# Patient Record
Sex: FEMALE | Race: OTHER | HISPANIC OR LATINO | Employment: UNEMPLOYED | URBAN - METROPOLITAN AREA
[De-identification: names, ages, dates, MRNs, and addresses within clinical notes are randomized per-mention and may not be internally consistent; named-entity substitution may affect disease eponyms.]

---

## 2021-02-23 ENCOUNTER — TELEPHONE (OUTPATIENT)
Dept: FAMILY MEDICINE CLINIC | Facility: CLINIC | Age: 26
End: 2021-02-23

## 2021-02-23 NOTE — TELEPHONE ENCOUNTER
Spoke to Kandy Norman at [facility] [employer], she advised that patient has active insurance through them but she needs to call the [facility] office to make sure they terminated her other insurances through [facility]

## 2021-03-03 ENCOUNTER — OFFICE VISIT (OUTPATIENT)
Dept: FAMILY MEDICINE CLINIC | Facility: CLINIC | Age: 26
End: 2021-03-03
Payer: COMMERCIAL

## 2021-03-03 VITALS
OXYGEN SATURATION: 98 % | DIASTOLIC BLOOD PRESSURE: 77 MMHG | HEART RATE: 109 BPM | BODY MASS INDEX: 47.12 KG/M2 | WEIGHT: 276 LBS | TEMPERATURE: 96.8 F | SYSTOLIC BLOOD PRESSURE: 132 MMHG | HEIGHT: 64 IN

## 2021-03-03 DIAGNOSIS — L98.9 SKIN LESION: ICD-10-CM

## 2021-03-03 DIAGNOSIS — R03.0 BLOOD PRESSURE ELEVATED WITHOUT HISTORY OF HTN: ICD-10-CM

## 2021-03-03 DIAGNOSIS — R73.03 PREDIABETES: ICD-10-CM

## 2021-03-03 DIAGNOSIS — E28.2 POLYCYSTIC OVARIAN DISEASE: Primary | ICD-10-CM

## 2021-03-03 DIAGNOSIS — R00.0 INCREASED HEART RATE: ICD-10-CM

## 2021-03-03 DIAGNOSIS — R63.5 WEIGHT GAIN: ICD-10-CM

## 2021-03-03 DIAGNOSIS — N63.20 LEFT BREAST LUMP: ICD-10-CM

## 2021-03-03 DIAGNOSIS — E66.01 MORBID OBESITY (HCC): ICD-10-CM

## 2021-03-03 PROCEDURE — 99204 OFFICE O/P NEW MOD 45 MIN: CPT | Performed by: FAMILY MEDICINE

## 2021-03-03 PROCEDURE — 3725F SCREEN DEPRESSION PERFORMED: CPT | Performed by: FAMILY MEDICINE

## 2021-03-03 NOTE — PROGRESS NOTES
Assessment/Plan:       No problem-specific Assessment & Plan notes found for this encounter  Diagnoses and all orders for this visit:    Polycystic ovarian disease  -     Ambulatory referral to Obstetrics / Gynecology; Future  -     Lipid Panel with Direct LDL reflex; Future  -     Comprehensive metabolic panel; Future  -     CBC and differential; Future  -     Pregnancy Test (HCG Qualitative); Future    Prediabetes  -     Lipid Panel with Direct LDL reflex; Future  -     Hemoglobin A1C; Future  -     Comprehensive metabolic panel; Future  -     Microalbumin / creatinine urine ratio    Weight gain  -     TSH, 3rd generation with Free T4 reflex; Future  -     Lipid Panel with Direct LDL reflex; Future    Blood pressure elevated without history of HTN  -     Comprehensive metabolic panel; Future  -     CBC and differential; Future  -     Cancel: POCT ECG  -     ECG 12 lead; Future    Increased heart rate  -     Cancel: POCT ECG  -     ECG 12 lead; Future    Skin lesion    Left breast lump  -     US breast left limited (diagnostic); Future    Morbid obesity [facility])        Patient Instructions   Follow up with test results      Orders Placed This Encounter   Procedures    US breast left limited (diagnostic)     Standing Status:   Future     Standing Expiration Date:   3/3/2025     Scheduling Instructions:      Please bring all non [facility] breast imaging studies with your appointment  Having your comparison exams available to us on the day of your exam allows you to obtain your results faster and may decrease the need foradditional testing  If patient is due for a screening mammogram, you must schedule the screening mammogram and ABUS in the same location on the same day for best patient care  Do not wear any perfume, powder, lotion or deodorant on the breast of underarm the day of testing  Please bring your insurance cards, form of photo ID and a list of youmedications with you  To schedule this appointment, please contact Central Scheduling at [phone]  Order Specific Question:   Reason for Exam:     Answer:   Lumpy area medial left breast     Order Specific Question:   Is the patient pregnant? Answer:   Unknown     Order Specific Question:   Approval for Diagnostic Call Back/Ultrasound If Necessary     Answer:   Yes     Order Specific Question:   Approval for Coordination for Additional Testing     Answer: Yes    TSH, 3rd generation with Free T4 reflex     Standing Status:   Future     Standing Expiration Date:   3/3/2022    Lipid Panel with Direct LDL reflex     This is a patient instruction: This test requires patient fasting for 10-12 hours or longer  Drinking of black coffee or black tea is acceptable  Standing Status:   Future     Standing Expiration Date:   3/3/2022    Hemoglobin A1C     Standing Status:   Future     Standing Expiration Date:   3/3/2022    Comprehensive metabolic panel     This is a patient instruction: Patient fasting for 8 hours or longer recommended  Standing Status:   Future     Standing Expiration Date:   3/3/2022    Microalbumin / creatinine urine ratio    CBC and differential     This is a patient instruction: This test is non-fasting  Please drink two glasses of water morning of bloodwork          Standing Status:   Future     Standing Expiration Date:   3/3/2022    Pregnancy Test (HCG Qualitative)     Standing Status:   Future     Standing Expiration Date:   3/3/2022    Ambulatory referral to Obstetrics / Gynecology     Standing Status:   Future     Standing Expiration Date:   3/3/2022     Referral Priority:   Routine     Referral Type:   Consult - AMB     Referral Reason:   Specialty Services Required     Referred to Provider:   Dk Talley MD     Requested Specialty:   Obstetrics and Gynecology     Number of Visits Requested:   1     Expiration Date:   3/3/2022    ECG 12 lead     Standing Status:   Future Standing Expiration Date:   3/3/2022         Subjective:     Patient ID: Sandy Golden is a 31 y o  female      HPI  New patient, she wants to establish  Follow-up  Polycystic ovarian disease  Patient stated about 1 year ago start having irregularity of her menstrual   She was evaluated and she was diagnosed with polycystic ovarian disease she was taking birth control  She stated her menstrual   Comes every 2-3 months  Last time she had her menstrual period was December 15 last year  Patient is sexually active with the same sex partner  Prediabetic  About 1 year ago she was told she was prediabetic  He was not placed on any medication  Denied polyuria or polydipsia  Skin lesion patient stated she has large skin lesion at the lower back for 2 years with no changes  Denied bleeding or pain  Obesity  Patient has been obese for a very long time  Recently has gained 10 lb in the last 2 months  Patient did not change her diet or level of exercise  New complaint  Breast lump  She did notice  about 2 months ago a small lump located at the medial mid breast   Next to the chest wall  No change in the size  Not painful  Denied injury  Her pulse and her blood pressure is slightly abnormal at this office visit patient stated she usually gets nervous when she comes to the doctor office and she feel little nervous right now  She denied any history of hypertension or arrhythmia    Review of Systems   Constitutional: Positive for unexpected weight change  Negative for activity change, appetite change, chills, fatigue and fever  HENT: Negative for congestion, ear discharge, ear pain, hearing loss, nosebleeds, rhinorrhea, sinus pressure, sore throat, tinnitus, trouble swallowing and voice change  Eyes: Negative for photophobia, pain and visual disturbance  Respiratory: Negative for cough, chest tightness, shortness of breath and wheezing      Cardiovascular: Negative for chest pain, palpitations and leg swelling  Gastrointestinal: Negative for abdominal pain, anal bleeding, blood in stool, constipation, diarrhea, nausea and vomiting  Endocrine: Negative for cold intolerance, heat intolerance, polydipsia and polyuria  Genitourinary: Positive for menstrual problem  Negative for dysuria, frequency, hematuria and urgency  Musculoskeletal: Negative for arthralgias, back pain, gait problem, joint swelling, myalgias and neck pain  Skin: Negative for color change, pallor and rash  Neurological: Negative for dizziness, tremors, seizures, syncope, facial asymmetry, speech difficulty, weakness, light-headedness and headaches  Hematological: Negative for adenopathy  Does not bruise/bleed easily  Psychiatric/Behavioral: Negative for agitation, behavioral problems, confusion, dysphoric mood, hallucinations and sleep disturbance  The patient is not nervous/anxious  Objective:     Physical Exam  Constitutional:       General: She is not in acute distress  Appearance: Normal appearance  She is well-developed  She is obese  She is not diaphoretic  HENT:      Head: Normocephalic and atraumatic  Nose:      Comments: Nose, and throat  Not examined  Patient has a mask  Due to COVID  Eyes:      General: No scleral icterus  Right eye: No discharge  Left eye: No discharge  Conjunctiva/sclera: Conjunctivae normal       Pupils: Pupils are equal, round, and reactive to light  Neck:      Musculoskeletal: Normal range of motion and neck supple  No muscular tenderness  Thyroid: No thyromegaly  Vascular: No carotid bruit or JVD  Cardiovascular:      Rate and Rhythm: Normal rate and regular rhythm  Pulses:           Carotid pulses are 3+ on the right side and 3+ on the left side  Posterior tibial pulses are 3+ on the right side and 3+ on the left side  Heart sounds: Normal heart sounds  No murmur  Comments: Extremities    No edema  Pulmonary:      Effort: Pulmonary effort is normal       Breath sounds: Normal breath sounds  No wheezing or rales  Abdominal:      General: Bowel sounds are normal  There is no distension  Palpations: Abdomen is soft  There is no mass  Tenderness: There is no abdominal tenderness  There is no guarding or rebound  Hernia: No hernia is present  Musculoskeletal:      Right lower leg: No edema  Left lower leg: No edema  Comments: Left breast   There is 1 5 x 1 5 firm area located at the mid left breast at the junction with the chest wall  There is 2 scar next to wound  Not tender to touch and not red  Breast exam was done in the presence of medical assistant Ning   Lymphadenopathy:      Cervical: No cervical adenopathy  Skin:     Coloration: Skin is not jaundiced  Findings: No erythema or rash  Comments: pandiculate  skin color lesion 1 x 1 5 cm at the lower mid back    There is a small moderate-size keloid upper back  Patient stated is treated with cortisone injection also there is couple small keloid at the left chest wall   Neurological:      General: No focal deficit present  Mental Status: She is alert and oriented to person, place, and time  Cranial Nerves: No cranial nerve deficit  Motor: No abnormal muscle tone  Coordination: Coordination normal    Psychiatric:         Mood and Affect: Mood normal          Behavior: Behavior normal      BMI Counseling: Body mass index is 47 38 kg/m²  The BMI is above normal  Nutrition recommendations include decreasing portion sizes and moderation in carbohydrate intake

## 2021-03-17 NOTE — PROGRESS NOTES
Assessment/Plan:     Diagnoses and all orders for this visit:    PCOS (polycystic ovarian syndrome)  -     Follicle stimulating hormone; Future  -     DHEA-sulfate; Future  -     hCG, quantitative; Future  -     Testosterone, free, total; Future  -     Prolactin; Future  -     Lipid panel; Future  -     US pelvis transabdominal only; Future  -     medroxyPROGESTERone (PROVERA) 10 mg tablet; Take 1 tablet (10 mg total) by mouth daily    Amenorrhea  -     medroxyPROGESTERone (PROVERA) 10 mg tablet; Take 1 tablet (10 mg total) by mouth daily           Polycystic ovarian syndrome is diagnosed based of 2/3 criteria  Criteria for diagnosis of PCOS include polycystic ovaries on ultrasound, menstrual irregularity, signs of hyperandrogenism such as hirsutism, acne vulgaris or elevated testosterone levels  Patient with hirsutism and amenorrhea and most likely diagnosis of PCOS  Labs and US ordered for patient  Discussed other clinical feature such as obesity and insulin resistance, acanthosis nigricans or metabolic syndrome  Discussed weight loss as a primary modality to help improve ovarian function  Discussed that weight loss as little as 5% can improve menstrual irregularities  Referred patient to  Weight Management  Women with diagnosis of PCOS should be screened for type 2 diabetes  Women with PCOS should also be screen for hypercholesterolemia with a lipid panel  HgbA1c and lipid panel ordered  In women with PCOS who are not attempting to conceive, combined hormonal contraception is the best medical maintenance therapy to treat menstrual disorders  Cyclic oral progestin therapy or even progestin Intrauterine device can help prevent endometrial hyperplasia  Insulin sensitizing agent such as metformin can help improve insulin resistance that can help improve ovulation rates      Discussed infertility could be an issue and in women who were attempting to conceive, aromatase inhibitor such as letrozole or clomiphene citrate can help with ovulation  Progesterone withdrawal ordered for now, and will most likely start progestin only pill at next visit (h/o migraines)  Subjective   Patient ID: Roc Steiner is a 31 y o  female  Patient is here for a problem visit  Chief Complaint   Patient presents with    New Patient Visit     PCOS, has irregular periods   #[id number]     Patient with irregular periods  She has periods 3 months-1 year  She has had irregular periods since age 11  Periods when they occur, are heavy  She uses pads, 4-5  She has pain with periods, severe  She does have a h/o migraine with aura    She denies a history of stroke, DVT or PE  She is a nonsmoker  She denies a history of uncontrolled high blood pressure  She denies a history of liver disease  She has no history of breast disease  She is sexually active, female, but no vaginal penetration  She has been on birth control previously in [place]  She does not recall pill brand  She has been diagnosed in [place] with PCOS  She had US done  She has does report facial hair and acne  Menstrual History:  OB History        0    Para   0    Term   0       0    AB   0    Living   0       SAB   0    TAB   0    Ectopic   0    Multiple   0    Live Births   0                Menarche age: 14  Patient's last menstrual period was 12/10/2020  Period Duration (Days): 5  Period Pattern: (!) Irregular   Periods 3 months to 1 year  LMP Dec, 2020      Past Medical History:   Diagnosis Date    Polycystic ovarian disease        History reviewed  No pertinent surgical history  Social History     Tobacco Use    Smoking status: Never Smoker    Smokeless tobacco: Never Used   Substance Use Topics    Alcohol use:  Yes     Alcohol/week: 6 0 standard drinks     Types: 6 Cans of beer per week     Frequency: 2-3 times a week     Drinks per session: 3 or 4    Drug use: Never        No Known Allergies    No current outpatient medications on file  Review of Systems   Constitutional: Positive for unexpected weight change  HENT: Negative  Eyes: Negative  Respiratory: Negative  Cardiovascular: Negative  Gastrointestinal: Negative  Endocrine: Negative  Genitourinary:        As noted in HPI   Musculoskeletal: Negative  Skin: Negative  Allergic/Immunologic: Negative  Neurological: Negative  Hematological: Negative  Psychiatric/Behavioral: Negative  /80 (BP Location: Right arm, Patient Position: Sitting, Cuff Size: Adult)   Pulse 100   Temp 98 2 °F (36 8 °C) (Temporal)   Ht 5' 4" (1 626 m)   Wt 126 kg (277 lb 12 8 oz)   LMP 12/10/2020   BMI 47 68 kg/m²       Physical Exam  Constitutional:       General: She is not in acute distress  Appearance: She is well-developed  Abdominal:      Palpations: Abdomen is soft  Tenderness: There is no abdominal tenderness  There is no guarding  Neurological:      Mental Status: She is alert and oriented to person, place, and time  Skin:     General: Skin is warm and dry     Psychiatric:         Behavior: Behavior normal

## 2021-03-18 ENCOUNTER — OFFICE VISIT (OUTPATIENT)
Dept: OBGYN CLINIC | Facility: CLINIC | Age: 26
End: 2021-03-18
Payer: COMMERCIAL

## 2021-03-18 VITALS
WEIGHT: 277.8 LBS | TEMPERATURE: 98.2 F | DIASTOLIC BLOOD PRESSURE: 80 MMHG | SYSTOLIC BLOOD PRESSURE: 110 MMHG | BODY MASS INDEX: 47.43 KG/M2 | HEART RATE: 100 BPM | HEIGHT: 64 IN

## 2021-03-18 DIAGNOSIS — N91.2 AMENORRHEA: ICD-10-CM

## 2021-03-18 DIAGNOSIS — E28.2 PCOS (POLYCYSTIC OVARIAN SYNDROME): Primary | ICD-10-CM

## 2021-03-18 PROCEDURE — 3008F BODY MASS INDEX DOCD: CPT | Performed by: OBSTETRICS & GYNECOLOGY

## 2021-03-18 PROCEDURE — 1036F TOBACCO NON-USER: CPT | Performed by: OBSTETRICS & GYNECOLOGY

## 2021-03-18 PROCEDURE — 99203 OFFICE O/P NEW LOW 30 MIN: CPT | Performed by: OBSTETRICS & GYNECOLOGY

## 2021-03-18 RX ORDER — MEDROXYPROGESTERONE ACETATE 10 MG/1
10 TABLET ORAL DAILY
Qty: 14 TABLET | Refills: 0 | Status: SHIPPED | OUTPATIENT
Start: 2021-03-18 | End: 2021-07-28

## 2021-03-18 NOTE — LETTER
March 18, 2021     Graham Mckinney MD    [address]    Patient: Keyur Rodarte   YOB: 1995   Date of Visit: 3/18/2021       Dear Dr Floyd Pert: Thank you for referring Keyur Rodarte to me for evaluation  Below are my notes for this consultation  If you have questions, please do not hesitate to call me  I look forward to following your patient along with you  Sincerely,        Dk Talley MD        CC: No Recipients  Dk Talley MD  3/18/2021  8:56 AM  Signed  Assessment/Plan:     Diagnoses and all orders for this visit:    PCOS (polycystic ovarian syndrome)  -     Follicle stimulating hormone; Future  -     DHEA-sulfate; Future  -     hCG, quantitative; Future  -     Testosterone, free, total; Future  -     Prolactin; Future  -     Lipid panel; Future  -     US pelvis transabdominal only; Future  -     medroxyPROGESTERone (PROVERA) 10 mg tablet; Take 1 tablet (10 mg total) by mouth daily    Amenorrhea  -     medroxyPROGESTERone (PROVERA) 10 mg tablet; Take 1 tablet (10 mg total) by mouth daily           Polycystic ovarian syndrome is diagnosed based of 2/3 criteria  Criteria for diagnosis of PCOS include polycystic ovaries on ultrasound, menstrual irregularity, signs of hyperandrogenism such as hirsutism, acne vulgaris or elevated testosterone levels  Patient with hirsutism and amenorrhea and most likely diagnosis of PCOS  Labs and US ordered for patient  Discussed other clinical feature such as obesity and insulin resistance, acanthosis nigricans or metabolic syndrome  Discussed weight loss as a primary modality to help improve ovarian function  Discussed that weight loss as little as 5% can improve menstrual irregularities  Referred patient to  Weight Management  Women with diagnosis of PCOS should be screened for type 2 diabetes  Women with PCOS should also be screen for hypercholesterolemia with a lipid panel   HgbA1c and lipid panel ordered  In women with PCOS who are not attempting to conceive, combined hormonal contraception is the best medical maintenance therapy to treat menstrual disorders  Cyclic oral progestin therapy or even progestin Intrauterine device can help prevent endometrial hyperplasia  Insulin sensitizing agent such as metformin can help improve insulin resistance that can help improve ovulation rates  Discussed infertility could be an issue and in women who were attempting to conceive, aromatase inhibitor such as letrozole or clomiphene citrate can help with ovulation  Progesterone withdrawal ordered for now, and will most likely start progestin only pill at next visit (h/o migraines)  Subjective   Patient ID: Man Goldberg is a 31 y o  female  Patient is here for a problem visit  Chief Complaint   Patient presents with    New Patient Visit     PCOS, has irregular periods   #[id number]     Patient with irregular periods  She has periods 3 months-1 year  She has had irregular periods since age 11  Periods when they occur, are heavy  She uses pads, 4-5  She has pain with periods, severe  She does have a h/o migraine with aura    She denies a history of stroke, DVT or PE  She is a nonsmoker  She denies a history of uncontrolled high blood pressure  She denies a history of liver disease  She has no history of breast disease  She is sexually active, female, but no vaginal penetration  She has been on birth control previously in [place]  She does not recall pill brand  She has been diagnosed in [place] with PCOS  She had US done  She has does report facial hair and acne  Menstrual History:  OB History        0    Para   0    Term   0       0    AB   0    Living   0       SAB   0    TAB   0    Ectopic   0    Multiple   0    Live Births   0                Menarche age: 14  Patient's last menstrual period was 12/10/2020    Period Duration (Days): 5  Period Pattern: (!) Irregular   Periods 3 months to 1 year  LMP Dec, 2020      Past Medical History:   Diagnosis Date    Polycystic ovarian disease        History reviewed  No pertinent surgical history  Social History     Tobacco Use    Smoking status: Never Smoker    Smokeless tobacco: Never Used   Substance Use Topics    Alcohol use: Yes     Alcohol/week: 6 0 standard drinks     Types: 6 Cans of beer per week     Frequency: 2-3 times a week     Drinks per session: 3 or 4    Drug use: Never        No Known Allergies    No current outpatient medications on file  Review of Systems   Constitutional: Positive for unexpected weight change  HENT: Negative  Eyes: Negative  Respiratory: Negative  Cardiovascular: Negative  Gastrointestinal: Negative  Endocrine: Negative  Genitourinary:        As noted in HPI   Musculoskeletal: Negative  Skin: Negative  Allergic/Immunologic: Negative  Neurological: Negative  Hematological: Negative  Psychiatric/Behavioral: Negative  /80 (BP Location: Right arm, Patient Position: Sitting, Cuff Size: Adult)   Pulse 100   Temp 98 2 °F (36 8 °C) (Temporal)   Ht 5' 4" (1 626 m)   Wt 126 kg (277 lb 12 8 oz)   LMP 12/10/2020   BMI 47 68 kg/m²       Physical Exam  Constitutional:       General: She is not in acute distress  Appearance: She is well-developed  Abdominal:      Palpations: Abdomen is soft  Tenderness: There is no abdominal tenderness  There is no guarding  Neurological:      Mental Status: She is alert and oriented to person, place, and time  Skin:     General: Skin is warm and dry     Psychiatric:         Behavior: Behavior normal

## 2021-03-18 NOTE — PATIENT INSTRUCTIONS
Síndrome de ovario poliquístico   CUIDADO AMBULATORIO:   El síndrome del ovario poliquístico (SOPQ) es un tika de síntomas causados por un trastorno hormonal  Con el síndrome de ovario poliquístico, freire cuerpo produce demasiadas hormonas y archana ovarios no funcionan correctamente  Archana ovarios tienen sacos llenos de líquido con un óvulo inmaduro en cada samy  Estos se llaman folículos  Los folículos crecen y [employer] archana ovarios se vean zafar si tuvieran quistes  El síndrome de ovario poliquístico aumenta el riesgo de cáncer de endometrio e infertilidad  Los signos y síntomas comunes incluyen:  · Períodos mensuales irregulares o ausencia del período    · Crecimiento extremo de vello en la adwoa, el pecho y la espalda    · Acné, adelgazamiento del salty o calvicie en el cuero cabelludo    · Aumento de peso, especialmente alrededor del abdomen    · Niveles de azúcar en la jaclyn o presión arterial zbigniew    · Períodos de extrema tristeza y extrema balbina    · Dificultad para quedar embarazada    · Oscurecimiento de la piel alrededor del himanshu, la amy y debajo de los senos    · Papilomas cutáneos en las axilas o en el himanshu    Llame a freire médico si:  · Archana síntomas Eldonna Baldy  · Usted tiene preguntas o inquietudes acerca de freire condición o cuidado  El tratamiento del síndrome de ovario poliquístico podría incluir cualquiera de los siguientes:  · Los medicamentos podrían administrarse para controlar el azúcar en la jaclyn  Usted puede necesitar medicamentos para disminuir las hormonas masculinas e incrementar las hormonas femeninas  Estos medicamentos también pueden mejorar el acné la calvicie y el crecimiento del salty que no desea  Es posible que también necesite medicamentos que la ayuden a ovular si Terris Sciara  · Cambios en el estilo de mi:     ? Controle otras afecciones de genny  El síndrome de ovario poliquístico aumenta el riesgo de diabetes, enfermedad cardíaca y presión arterial zbigniew  Es posible que freire médico lo envíe a especialistas que le enseñen a manejar estas afecciones  ? Mantenga un peso saludable  Consulte con freire médico cuánto debería pesar  Pídale que lo ayude a crear un plan para bajar de peso si tiene sobrepeso  La pérdida de peso podría ayudar a reducir las complicaciones del síndrome de ovario poliquístico  Usted y freire médico podrán fijar metas de pérdida de peso alcanzables  ? Ejercítese según indicaciones  La actividad física puede ayudarlo a mantener el nivel de glucosa en la jaclyn estable, reducir freire riesgo de insuficiencia cardíaca y Marlea Odor a bajar de [place]  Realice wilton actividad física al menos 150 minutos cada semana  Reparta esta cantidad de ejercicio ronan al menos 3 días a la semana  No deje de ejercitar por más de 2 días seguidos  Incluya ejercicios para fortalecer los músculos de 2 a 3 días a la semana  Ejemplos de ejercicio incluyen caminar o nadar  No permanezca sentada por más de 30 minutos cada vez  Colabore con freire médico para desarrollar un plan de ejercicios que sea adecuado para usted  ? Consuma alimentos saludables y variados  Los alimentos saludables incluyen frutas, verduras, pan integral, productos lácteos bajos en grasa, frijoles, mera magras y pescado  Un dietista puede ayudarla a planear las comidas para ayudarla a [address] gisella otras condiciones de [place]  Acuda a la consulta de control con freire médico según las indicaciones: Es posible que usted necesite regresar para que le mary anne más exámenes o para verificar si el tratamiento está funcionando  Anote gisella preguntas para que se acuerde de hacerlas ronan gisella visitas  © Copyright [address] Information is for End User's use only and may not be sold, redistributed or otherwise used for commercial purposes  All illustrations and images included in CareNotes® are the copyrighted property of A D A M , [employer]  or [address] [place] [address] es sólo para uso en educación   Freire intención no es darle un consejo médico sobre enfermedades o tratamientos  Colsulte con freire Yecenia Bough farmacéutico antes de seguir cualquier régimen médico para saber si es seguro y efectivo para usted

## 2021-03-23 ENCOUNTER — TELEPHONE (OUTPATIENT)
Dept: OBGYN CLINIC | Facility: CLINIC | Age: 26
End: 2021-03-23

## 2021-03-23 DIAGNOSIS — E28.2 PCOS (POLYCYSTIC OVARIAN SYNDROME): Primary | ICD-10-CM

## 2021-03-23 NOTE — TELEPHONE ENCOUNTER
I do not have access to   Please have her stop for now and I can call her on Thursday   If she agrees to Thursday then please send message back to me

## 2021-03-23 NOTE — TELEPHONE ENCOUNTER
Called patient back, [facility] #[id number] "Ana Muriel" states she will stop taking the Provera and will talk to you on Thursday when you are back in the office

## 2021-03-23 NOTE — TELEPHONE ENCOUNTER
Patient would like to speak with you regarding her Provera, she had to stop taking these due to headaches   (will need )

## 2021-03-25 ENCOUNTER — HOSPITAL ENCOUNTER (OUTPATIENT)
Dept: MAMMOGRAPHY | Facility: CLINIC | Age: 26
Discharge: HOME | End: 2021-03-25
Payer: COMMERCIAL

## 2021-03-25 VITALS — WEIGHT: 277 LBS | HEIGHT: 64 IN | BODY MASS INDEX: 47.29 KG/M2

## 2021-03-25 DIAGNOSIS — N63.20 LEFT BREAST LUMP: ICD-10-CM

## 2021-03-25 PROCEDURE — 76642 ULTRASOUND BREAST LIMITED: CPT

## 2021-03-26 RX ORDER — ACETAMINOPHEN AND CODEINE PHOSPHATE 120; 12 MG/5ML; MG/5ML
1 SOLUTION ORAL DAILY
Qty: 28 TABLET | Refills: 3 | Status: SHIPPED | OUTPATIENT
Start: 2021-03-26 | End: 2021-08-16

## 2021-03-26 NOTE — TELEPHONE ENCOUNTER
[facility]    [id number]    States patient was having headaches from medication  She has taken the medication for about 4-5 days  She has not gotten a period since stopping the medication  Mini Pill will be prescribed  Due to h/o migraines, she is unable to recall the pill she took in [place]  Advised to keep appt on 4/1 with me

## 2021-04-02 ENCOUNTER — APPOINTMENT (OUTPATIENT)
Dept: LAB | Facility: MEDICAL CENTER | Age: 26
End: 2021-04-02
Payer: COMMERCIAL

## 2021-04-02 ENCOUNTER — CLINICAL SUPPORT (OUTPATIENT)
Dept: URGENT CARE | Facility: MEDICAL CENTER | Age: 26
End: 2021-04-02
Payer: COMMERCIAL

## 2021-04-02 DIAGNOSIS — R00.0 INCREASED HEART RATE: ICD-10-CM

## 2021-04-02 DIAGNOSIS — R73.03 PREDIABETES: ICD-10-CM

## 2021-04-02 DIAGNOSIS — R03.0 BLOOD PRESSURE ELEVATED WITHOUT HISTORY OF HTN: ICD-10-CM

## 2021-04-02 DIAGNOSIS — E28.2 POLYCYSTIC OVARIAN DISEASE: ICD-10-CM

## 2021-04-02 DIAGNOSIS — E28.2 PCOS (POLYCYSTIC OVARIAN SYNDROME): ICD-10-CM

## 2021-04-02 DIAGNOSIS — R63.5 WEIGHT GAIN: ICD-10-CM

## 2021-04-02 LAB
ALBUMIN SERPL BCP-MCNC: 3.7 G/DL (ref 3.5–5)
ALP SERPL-CCNC: 79 U/L (ref 46–116)
ALT SERPL W P-5'-P-CCNC: 22 U/L (ref 12–78)
ANION GAP SERPL CALCULATED.3IONS-SCNC: 2 MMOL/L (ref 4–13)
AST SERPL W P-5'-P-CCNC: 9 U/L (ref 5–45)
ATRIAL RATE: 56 BPM
B-HCG SERPL-ACNC: <2 MIU/ML
BASOPHILS # BLD AUTO: 0.02 THOUSANDS/ΜL (ref 0–0.1)
BASOPHILS NFR BLD AUTO: 1 % (ref 0–1)
BILIRUB SERPL-MCNC: 0.63 MG/DL (ref 0.2–1)
BUN SERPL-MCNC: 9 MG/DL (ref 5–25)
CALCIUM SERPL-MCNC: 9.5 MG/DL (ref 8.3–10.1)
CHLORIDE SERPL-SCNC: 107 MMOL/L (ref 100–108)
CHOLEST SERPL-MCNC: 216 MG/DL (ref 50–200)
CO2 SERPL-SCNC: 28 MMOL/L (ref 21–32)
CREAT SERPL-MCNC: 0.68 MG/DL (ref 0.6–1.3)
EOSINOPHIL # BLD AUTO: 0.07 THOUSAND/ΜL (ref 0–0.61)
EOSINOPHIL NFR BLD AUTO: 2 % (ref 0–6)
ERYTHROCYTE [DISTWIDTH] IN BLOOD BY AUTOMATED COUNT: 16.3 % (ref 11.6–15.1)
EST. AVERAGE GLUCOSE BLD GHB EST-MCNC: 126 MG/DL
FSH SERPL-ACNC: 5.3 MIU/ML
GFR SERPL CREATININE-BSD FRML MDRD: 121 ML/MIN/1.73SQ M
GLUCOSE P FAST SERPL-MCNC: 93 MG/DL (ref 65–99)
HBA1C MFR BLD: 6 %
HCT VFR BLD AUTO: 35.8 % (ref 34.8–46.1)
HDLC SERPL-MCNC: 50 MG/DL
HGB BLD-MCNC: 10.5 G/DL (ref 11.5–15.4)
IMM GRANULOCYTES # BLD AUTO: 0.01 THOUSAND/UL (ref 0–0.2)
IMM GRANULOCYTES NFR BLD AUTO: 0 % (ref 0–2)
LDLC SERPL CALC-MCNC: 151 MG/DL (ref 0–100)
LYMPHOCYTES # BLD AUTO: 1.75 THOUSANDS/ΜL (ref 0.6–4.47)
LYMPHOCYTES NFR BLD AUTO: 40 % (ref 14–44)
MCH RBC QN AUTO: 22.3 PG (ref 26.8–34.3)
MCHC RBC AUTO-ENTMCNC: 29.3 G/DL (ref 31.4–37.4)
MCV RBC AUTO: 76 FL (ref 82–98)
MONOCYTES # BLD AUTO: 0.38 THOUSAND/ΜL (ref 0.17–1.22)
MONOCYTES NFR BLD AUTO: 9 % (ref 4–12)
NEUTROPHILS # BLD AUTO: 2.15 THOUSANDS/ΜL (ref 1.85–7.62)
NEUTS SEG NFR BLD AUTO: 48 % (ref 43–75)
NRBC BLD AUTO-RTO: 0 /100 WBCS
P AXIS: -3 DEGREES
PLATELET # BLD AUTO: 349 THOUSANDS/UL (ref 149–390)
PMV BLD AUTO: 10.2 FL (ref 8.9–12.7)
POTASSIUM SERPL-SCNC: 3.9 MMOL/L (ref 3.5–5.3)
PR INTERVAL: 160 MS
PROLACTIN SERPL-MCNC: 19.6 NG/ML
PROT SERPL-MCNC: 8.3 G/DL (ref 6.4–8.2)
QRS AXIS: 23 DEGREES
QRSD INTERVAL: 92 MS
QT INTERVAL: 406 MS
QTC INTERVAL: 391 MS
RBC # BLD AUTO: 4.71 MILLION/UL (ref 3.81–5.12)
SODIUM SERPL-SCNC: 137 MMOL/L (ref 136–145)
T WAVE AXIS: 2 DEGREES
TRIGL SERPL-MCNC: 76 MG/DL
TSH SERPL DL<=0.05 MIU/L-ACNC: 0.86 UIU/ML (ref 0.36–3.74)
VENTRICULAR RATE: 56 BPM
WBC # BLD AUTO: 4.38 THOUSAND/UL (ref 4.31–10.16)

## 2021-04-02 PROCEDURE — 84702 CHORIONIC GONADOTROPIN TEST: CPT

## 2021-04-02 PROCEDURE — 84403 ASSAY OF TOTAL TESTOSTERONE: CPT

## 2021-04-02 PROCEDURE — 84443 ASSAY THYROID STIM HORMONE: CPT

## 2021-04-02 PROCEDURE — 93010 ELECTROCARDIOGRAM REPORT: CPT | Performed by: INTERNAL MEDICINE

## 2021-04-02 PROCEDURE — 83001 ASSAY OF GONADOTROPIN (FSH): CPT

## 2021-04-02 PROCEDURE — 36415 COLL VENOUS BLD VENIPUNCTURE: CPT

## 2021-04-02 PROCEDURE — 85025 COMPLETE CBC W/AUTO DIFF WBC: CPT

## 2021-04-02 PROCEDURE — 83036 HEMOGLOBIN GLYCOSYLATED A1C: CPT

## 2021-04-02 PROCEDURE — 82627 DEHYDROEPIANDROSTERONE: CPT

## 2021-04-02 PROCEDURE — 84402 ASSAY OF FREE TESTOSTERONE: CPT

## 2021-04-02 PROCEDURE — 93005 ELECTROCARDIOGRAM TRACING: CPT

## 2021-04-02 PROCEDURE — 80053 COMPREHEN METABOLIC PANEL: CPT

## 2021-04-02 PROCEDURE — 80061 LIPID PANEL: CPT

## 2021-04-02 PROCEDURE — 84146 ASSAY OF PROLACTIN: CPT

## 2021-04-03 ENCOUNTER — APPOINTMENT (OUTPATIENT)
Dept: LAB | Facility: MEDICAL CENTER | Age: 26
End: 2021-04-03
Payer: COMMERCIAL

## 2021-04-03 LAB
CREAT UR-MCNC: 70.4 MG/DL
MICROALBUMIN UR-MCNC: 6.2 MG/L (ref 0–20)
MICROALBUMIN/CREAT 24H UR: 9 MG/G CREATININE (ref 0–30)

## 2021-04-03 PROCEDURE — 82570 ASSAY OF URINE CREATININE: CPT | Performed by: FAMILY MEDICINE

## 2021-04-03 PROCEDURE — 82043 UR ALBUMIN QUANTITATIVE: CPT | Performed by: FAMILY MEDICINE

## 2021-04-04 LAB — DHEA-S SERPL-MCNC: 409 UG/DL (ref 84.8–378)

## 2021-04-05 ENCOUNTER — OFFICE VISIT (OUTPATIENT)
Dept: FAMILY MEDICINE CLINIC | Facility: CLINIC | Age: 26
End: 2021-04-05
Payer: COMMERCIAL

## 2021-04-05 ENCOUNTER — HOSPITAL ENCOUNTER (OUTPATIENT)
Dept: ULTRASOUND IMAGING | Facility: MEDICAL CENTER | Age: 26
Discharge: HOME | End: 2021-04-05
Payer: COMMERCIAL

## 2021-04-05 ENCOUNTER — OFFICE VISIT (OUTPATIENT)
Dept: OBGYN CLINIC | Facility: CLINIC | Age: 26
End: 2021-04-05
Payer: COMMERCIAL

## 2021-04-05 VITALS
HEART RATE: 92 BPM | DIASTOLIC BLOOD PRESSURE: 78 MMHG | SYSTOLIC BLOOD PRESSURE: 124 MMHG | TEMPERATURE: 97.8 F | BODY MASS INDEX: 47.15 KG/M2 | HEIGHT: 64 IN | WEIGHT: 276.2 LBS

## 2021-04-05 VITALS
DIASTOLIC BLOOD PRESSURE: 78 MMHG | HEIGHT: 64 IN | TEMPERATURE: 98.1 F | HEART RATE: 66 BPM | WEIGHT: 276.4 LBS | BODY MASS INDEX: 47.19 KG/M2 | OXYGEN SATURATION: 99 % | SYSTOLIC BLOOD PRESSURE: 102 MMHG

## 2021-04-05 DIAGNOSIS — E28.2 PCOS (POLYCYSTIC OVARIAN SYNDROME): Primary | ICD-10-CM

## 2021-04-05 DIAGNOSIS — N63.20 LEFT BREAST LUMP: ICD-10-CM

## 2021-04-05 DIAGNOSIS — R63.5 WEIGHT GAIN: ICD-10-CM

## 2021-04-05 DIAGNOSIS — R03.0 BLOOD PRESSURE ELEVATED WITHOUT HISTORY OF HTN: ICD-10-CM

## 2021-04-05 DIAGNOSIS — E28.2 PCOS (POLYCYSTIC OVARIAN SYNDROME): ICD-10-CM

## 2021-04-05 DIAGNOSIS — R79.89 ELEVATED DHEA: ICD-10-CM

## 2021-04-05 DIAGNOSIS — R79.9 ABNORMAL BLOOD CHEMISTRY: ICD-10-CM

## 2021-04-05 DIAGNOSIS — Z11.4 ENCOUNTER FOR SCREENING FOR HIV: ICD-10-CM

## 2021-04-05 DIAGNOSIS — R79.89 ELEVATED TESTOSTERONE LEVEL: ICD-10-CM

## 2021-04-05 DIAGNOSIS — R73.03 PREDIABETES: ICD-10-CM

## 2021-04-05 DIAGNOSIS — Z71.85 IMMUNIZATION COUNSELING: ICD-10-CM

## 2021-04-05 DIAGNOSIS — Z00.00 WELL ADULT EXAM: Primary | ICD-10-CM

## 2021-04-05 DIAGNOSIS — E28.2 POLYCYSTIC OVARIAN DISEASE: ICD-10-CM

## 2021-04-05 DIAGNOSIS — D50.9 MICROCYTIC ANEMIA: ICD-10-CM

## 2021-04-05 DIAGNOSIS — E66.01 MORBID OBESITY (HCC): ICD-10-CM

## 2021-04-05 DIAGNOSIS — N91.2 AMENORRHEA: ICD-10-CM

## 2021-04-05 DIAGNOSIS — E78.00 PURE HYPERCHOLESTEROLEMIA: ICD-10-CM

## 2021-04-05 LAB
TESTOST FREE SERPL-MCNC: 6 PG/ML (ref 0–4.2)
TESTOST SERPL-MCNC: 61 NG/DL (ref 8–48)

## 2021-04-05 PROCEDURE — 1036F TOBACCO NON-USER: CPT | Performed by: OBSTETRICS & GYNECOLOGY

## 2021-04-05 PROCEDURE — 3725F SCREEN DEPRESSION PERFORMED: CPT | Performed by: FAMILY MEDICINE

## 2021-04-05 PROCEDURE — 99214 OFFICE O/P EST MOD 30 MIN: CPT | Performed by: FAMILY MEDICINE

## 2021-04-05 PROCEDURE — 76856 US EXAM PELVIC COMPLETE: CPT

## 2021-04-05 PROCEDURE — 99395 PREV VISIT EST AGE 18-39: CPT | Performed by: FAMILY MEDICINE

## 2021-04-05 PROCEDURE — 3008F BODY MASS INDEX DOCD: CPT | Performed by: FAMILY MEDICINE

## 2021-04-05 PROCEDURE — 99214 OFFICE O/P EST MOD 30 MIN: CPT | Performed by: OBSTETRICS & GYNECOLOGY

## 2021-04-05 NOTE — PROGRESS NOTES
Assessment/Plan:     Diagnoses and all orders for this visit:    PCOS (polycystic ovarian syndrome)  -     metFORMIN (GLUCOPHAGE) 850 mg tablet; Take 1 tablet (850 mg total) by mouth 2 (two) times a day with meals    Amenorrhea    Prediabetes          Patient with polycystic ovarian syndrome just based on criteria of oligomenorrhea as well as elevated androgen  Patient to have pelvic ultrasound today to check for polycystic ovaries  We will call her with results  Patient was counseled on treatment of PCOS  Menstrual irregularities including expectant management  She was counseled strongly on weight loss as a goal to achieve regular menses  Patient was advised on cyclic progesterone versus combined oral contraceptive pills for menstrual irregularities  She did experience headaches from Provera alone  Also discussed with patient expectant management and to call if no periods again by June 2020 1  Can consider Prometrium at that point  Also discussed with patient potential use of metformin to help improve anovulatory cycles  Discussed with patient potential side effects including nausea vomiting and GI upset and hypoglycemia  Did reviewed with patient as well results that show prediabetes as well as elevated  Cholesterol  Encouraged for patient to follow-up with her PCP with this regard  Patient is interested in starting metformin  She was started on 850 milligrams p o  b i d   Advised patient to return to the office in 6 months for medication check and follow-up  She is interested in trying to conceive in about 5-6 months  Discussed with patient potential use of ovulation induction with letrozole or clomiphene citrate  She also inquired about the possibility of intrauterine insemination  I did give patient information regarding MILLIE subspecialist in the area  [facility]   Information given  Subjective   Patient ID: Rosa Fraire is a 31 y o  female    Patient is here for a follow-up  Chief Complaint   Patient presents with    Follow-up     Discussion of results       [facility] [id number]      Patient was seen previously complaining of irregular menses and amenorrhea  Laboratory testing and pelvic ultrasound were ordered to rule out polycystic ovarian syndrome  She has a pelvic ultrasound scheduled tonight at 6 p m  Christiano Reveles She is sexually active and is planning on trying to conceive in about 5-6 months  She did have Provera after her last visit and her last menstrual period is 2021 from a previous menstrual period of 2020  Patient stated she had headaches on Provera and she had discontinued this  She had laboratory testing that showed normal pole acting, normal TSH, slightly elevated DHEA-S, testosterone levels are still pending  A1c shows 6 percent and LDL and total cholesterol were elevated  Menstrual History:  OB History        0    Para   0    Term   0       0    AB   0    Living   0       SAB   0    TAB   0    Ectopic   0    Multiple   0    Live Births   0                Menarche age: 14  Patient's last menstrual period was 2021  Past Medical History:   Diagnosis Date    Polycystic ovarian disease        Social History     Tobacco Use    Smoking status: Never Smoker    Smokeless tobacco: Never Used   Substance Use Topics    Alcohol use:  Yes     Alcohol/week: 6 0 standard drinks     Types: 6 Cans of beer per week     Frequency: 2-3 times a week     Drinks per session: 3 or 4    Drug use: Never       No Known Allergies      Current Outpatient Medications:     medroxyPROGESTERone (PROVERA) 10 mg tablet, Take 1 tablet (10 mg total) by mouth daily, Disp: 14 tablet, Rfl: 0    norethindrone (MICRONOR) 0 35 MG tablet, Take 1 tablet (0 35 mg total) by mouth daily, Disp: 28 tablet, Rfl: 3    metFORMIN (GLUCOPHAGE) 850 mg tablet, Take 1 tablet (850 mg total) by mouth 2 (two) times a day with meals, Disp: 60 tablet, Rfl: 6      Review of Systems   Constitutional: Negative  HENT: Negative  Eyes: Negative  Respiratory: Negative  Cardiovascular: Negative  Gastrointestinal: Negative  Endocrine: Negative  Genitourinary:        As noted in HPI   Musculoskeletal: Negative  Skin: Negative  Allergic/Immunologic: Negative  Neurological: Negative  Hematological: Negative  Psychiatric/Behavioral: Negative  /78 (BP Location: Right arm, Patient Position: Sitting, Cuff Size: Standard)   Pulse 92   Temp 97 8 °F (36 6 °C) (Temporal)   Ht 5' 4" (1 626 m)   Wt 125 kg (276 lb 3 2 oz)   LMP 03/28/2021   BMI 47 41 kg/m²       Physical Exam  Constitutional:       General: She is not in acute distress  Appearance: She is well-developed  Neurological:      Mental Status: She is alert and oriented to person, place, and time  Skin:     General: Skin is warm and dry  Psychiatric:         Behavior: Behavior normal                The remainder of her physical examination was deferred as she was here today for consultation and discussion  Counseling / Coordination of Care  Total time spent today30 minutes  minutes  Greater than 50% of total time was spent with the patient and / or family counseling and / or coordination of care

## 2021-04-05 NOTE — PATIENT INSTRUCTIONS
Síndrome de ovario poliquístico   CUIDADO AMBULATORIO:   El síndrome del ovario stewart   Metformina (Glucophage, Glucophage XR, Fortamet, Appformin, Riomet ER) - (Por vía oral)   Para qué se utiliza juan josé medicamento:   Se [place] para tratar la diabetes tipo 2  Comuníquese de inmediato con un médico o enfermera si usted tiene:  · Dificultad para respirar, ritmo cardíaco lento, desmayo, mareos  · Orina de color oscuro o evacuaciones intestinales pálidas  · Pérdida del apetito, dolor estomacal, náuseas, vómitos, coloración amarillenta de la piel u ojos  · Dolor o calambres musculares  · Cansancio o debilidad inusuales, somnolencia     Efectos secundarios comunes:  · Diarrea, gases  · Sabor metálico en [facility]  Information is for [employer] use only and may not be sold, redistributed or otherwise used for commercial purposes  iquístico (SOPQ) es un tika de síntomas causados por un trastorno hormonal  Con el síndrome de ovario poliquístico, freire cuerpo produce demasiadas hormonas y archana ovarios no funcionan correctamente  Archana ovarios tienen sacos llenos de líquido con un óvulo inmaduro en cada asmy  Estos se llaman folículos  Los folículos crecen y [employer] archana ovarios se vean zafar si tuvieran quistes  El síndrome de ovario poliquístico aumenta el riesgo de cáncer de endometrio e infertilidad    Los signos y síntomas comunes incluyen:  · Períodos mensuales irregulares o ausencia del período    · Crecimiento extremo de vello en la adwoa, el pecho y la espalda    · Acné, adelgazamiento del salty o calvicie en el cuero cabelludo    · Aumento de peso, especialmente alrededor del abdomen    · Niveles de azúcar en la jaclyn o presión arterial zbigniew    · Períodos de extrema tristeza y extrema balbina    · Dificultad para quedar embarazada    · Oscurecimiento de la piel alrededor del himanshu, la amy y debajo de los senos    · Papilomas cutáneos en las axilas o en el himanshu    Llame a freire médico si:  · Archana síntomas Cullen Rimes  · Usted tiene preguntas o inquietudes acerca de freire condición o cuidado  El tratamiento del síndrome de ovario poliquístico podría incluir cualquiera de los siguientes:  · Los medicamentos podrían administrarse para controlar el azúcar en la jaclyn  Usted puede necesitar medicamentos para disminuir las hormonas masculinas e incrementar las hormonas femeninas  Estos medicamentos también pueden mejorar el acné la calvicie y el crecimiento del salty que no desea  Es posible que también necesite medicamentos que la ayuden a ovular si Cyndee Moras  · Cambios en el estilo de mi:     ? Controle otras afecciones de genny  El síndrome de ovario poliquístico aumenta el riesgo de diabetes, enfermedad cardíaca y presión arterial zbigniew  Es posible que freire médico lo envíe a especialistas que le enseñen a manejar estas afecciones  ? Mantenga un peso saludable  Consulte con freire médico cuánto debería pesar  Pídale que lo ayude a crear un plan para bajar de peso si tiene sobrepeso  La pérdida de peso podría ayudar a reducir las complicaciones del síndrome de ovario poliquístico  Usted y freire médico podrán fijar metas de pérdida de peso alcanzables  ? Ejercítese según indicaciones  La actividad física puede ayudarlo a mantener el nivel de glucosa en la jaclyn estable, reducir freire riesgo de insuficiencia cardíaca y Manuel Moat a bajar de [place]  Realice wilton actividad física al menos 150 minutos cada semana  Reparta esta cantidad de ejercicio ronan al menos 3 días a la semana  No deje de ejercitar por más de 2 días seguidos  Incluya ejercicios para fortalecer los músculos de 2 a 3 días a la semana  Ejemplos de ejercicio incluyen caminar o nadar  No permanezca sentada por más de 30 minutos cada vez  Colabore con freire médico para desarrollar un plan de ejercicios que sea adecuado para usted  ? Consuma alimentos saludables y variados   Los alimentos saludables incluyen frutas, verduras, pan integral, productos lácteos bajos en grasa, frijoles, mera magras y pescado  Un dietista puede ayudarla a planear las comidas para ayudarla a [address] gisella otras condiciones de [place]  Acuda a la consulta de control con freire médico según las indicaciones: Es posible que usted necesite regresar para que le mary anne más exámenes o para verificar si el tratamiento está funcionando  Anote gisella preguntas para que se acuerde de hacerlas ronan gisella visitas  © Copyright [address] Information is for End User's use only and may not be sold, redistributed or otherwise used for commercial purposes  All illustrations and images included in CareNotes® are the copyrighted property of [employer]  or [address] [place] [address] es sólo para uso en educación  Freire intención no es darle un consejo médico sobre enfermedades o tratamientos  Colsulte con freire Roxana Lesser farmacéutico antes de seguir cualquier régimen médico para saber si es seguro y efectivo para usted

## 2021-04-05 NOTE — PROGRESS NOTES
Assessment/Plan:       No problem-specific Assessment & Plan notes found for this encounter  Diagnoses and all orders for this visit:    Well adult exam  Comments:  to follow with Ophthalmology    Prediabetes  Comments: Follow with 1800 calorie diet  Advised to lose weight    Blood pressure elevated without history of HTN  Comments:  Blood  pressure today is normal    Polycystic ovarian disease  Comments:  Gyn office visit today noted    Weight gain  Comments:  Diet discussed recommend referral to weight management  Patient was referred by Dr Johnie Barragan    Morbid obesity [facility])  Comments:  refer to weight management    Left breast lump  -     Ambulatory Referral to Breast Surgery; Future    Pure hypercholesterolemia  Comments: To follow with low-fat diet    Abnormal blood chemistry  Comments:  Low anion gap    Microcytic anemia  -     TIBC; Future  -     Iron Saturation %; Future  -     Iron; Future  -     Ferritin; Future  -     Hemoglobin Electrophoresis; Future    Elevated testosterone level  Comments:  Patient going to have pelvic ultrasound today  Following with gyn    Elevated DHEA ([address] )  Comments:  Patient is going to have pelvic ultrasound today  Following with gyn    Encounter for screening for HIV  Comments:  Verbal consent obtained     patient  Orders:  -     HIV 1/2 Antigen/Antibody (4th Generation) w Reflex [facility]; Future    Immunization counseling  Comments:  Recommend COVID vaccine  Bring immunization list to review and update        Patient Instructions   Follow up with test results      Orders Placed This Encounter   Procedures    TIBC     Standing Status:   Future     Standing Expiration Date:   4/5/2022    Iron Saturation %     Standing Status:   Future     Standing Expiration Date:   4/5/2022    Iron     Standing Status:   Future     Standing Expiration Date:   4/5/2022    Ferritin     Standing Status:   Future     Standing Expiration Date:   4/5/2022    Hemoglobin Electrophoresis Standing Status:   Future     Standing Expiration Date:   4/5/2022    HIV 1/2 Antigen/Antibody (4th Generation) w Reflex [facility]     Standing Status:   Future     Standing Expiration Date:   4/5/2022     Order Specific Question:   Release to patient through [employer]     Answer:   Immediate    Ambulatory Referral to Breast Surgery     Standing Status:   Future     Standing Expiration Date:   4/5/2022     Referral Priority:   ASAP     Referral Type:   Auth/Referral     Referral Reason:   Specialty Services Required     Referred to Provider:   Karley Morrissey MD     Requested Specialty:   Breast Surgery     Number of Visits Requested:   1     Expiration Date:   4/5/2022         Subjective:     Patient ID: Ryan Luna is a 31 y o  female      HPI   Well adult exam  Patient does not speak [employer], [place]  Translated to patient  She feels well  Denied anxiety or depression  She does not smoke she does not drink alcohol or does drugs  Exercise  Does not exercise  Diet   regular  Gyn exam recently  Immunization  Patient will bring immunization record  Dental, last time she saw her dentist about 6 week ago  Eye exam, she wear glasses, last eye exam February /19    Review of Systems   Constitutional: Negative for activity change, appetite change, chills, diaphoresis, fatigue, fever and unexpected weight change  HENT: Negative for congestion, ear discharge, ear pain, hearing loss, nosebleeds, rhinorrhea, sinus pressure, sore throat, tinnitus, trouble swallowing and voice change  Eyes: Negative for photophobia, pain, discharge, itching and visual disturbance  Respiratory: Negative for cough, chest tightness, shortness of breath and wheezing  Cardiovascular: Negative for chest pain, palpitations and leg swelling  Gastrointestinal: Negative for abdominal pain, anal bleeding, blood in stool, constipation, diarrhea, nausea and vomiting     Endocrine: Negative for cold intolerance, heat intolerance, polydipsia and polyuria  Genitourinary: Negative for dysuria, flank pain, frequency, hematuria, pelvic pain, urgency, vaginal bleeding and vaginal discharge  Musculoskeletal: Negative for arthralgias, back pain, gait problem, joint swelling, myalgias and neck pain  Skin: Negative for color change and rash  Allergic/Immunologic: Negative for food allergies  Neurological: Negative for dizziness, tremors, seizures, syncope, weakness, light-headedness, numbness and headaches  Hematological: Negative for adenopathy  Does not bruise/bleed easily  Psychiatric/Behavioral: Negative for agitation, behavioral problems, confusion, dysphoric mood, hallucinations and sleep disturbance  The patient is not nervous/anxious  All other systems reviewed and are negative  Objective:     Physical Exam  Constitutional:       General: She is not in acute distress  Appearance: Normal appearance  She is well-developed  She is obese  She is not ill-appearing or diaphoretic  HENT:      Head: Normocephalic and atraumatic  Right Ear: Tympanic membrane, ear canal and external ear normal       Left Ear: Tympanic membrane, ear canal and external ear normal       Ears:      Comments: Whisper test is normal bilaterally 5 ft away     Nose:      Comments: Nose and throat  Not examined  Patient has a mask  Due to COVID  Eyes:      General: No scleral icterus  Conjunctiva/sclera: Conjunctivae normal       Pupils: Pupils are equal, round, and reactive to light  Neck:      Musculoskeletal: Neck supple  Thyroid: No thyromegaly  Vascular: No carotid bruit or JVD  Cardiovascular:      Rate and Rhythm: Normal rate and regular rhythm  Pulses:           Carotid pulses are 3+ on the right side and 3+ on the left side  Dorsalis pedis pulses are 3+ on the right side and 3+ on the left side  Posterior tibial pulses are 3+ on the right side and 3+ on the left side  Heart sounds: Normal heart sounds   No murmur  Comments: Extremities  No edema  Pulmonary:      Effort: Pulmonary effort is normal  No respiratory distress  Breath sounds: Normal breath sounds  No stridor  No wheezing or rales  Abdominal:      General: Bowel sounds are normal  There is no distension  Palpations: Abdomen is soft  There is no mass  Tenderness: There is no abdominal tenderness  There is no guarding or rebound  Hernia: No hernia is present  Musculoskeletal: Normal range of motion  General: No deformity  Right lower leg: No edema  Left lower leg: No edema  Lymphadenopathy:      Cervical: No cervical adenopathy  Skin:     Coloration: Skin is not pale  Findings: No rash  Neurological:      General: No focal deficit present  Mental Status: She is alert and oriented to person, place, and time  Cranial Nerves: No cranial nerve deficit  Sensory: No sensory deficit  Motor: No weakness or abnormal muscle tone  Coordination: Coordination normal       Gait: Gait normal       Deep Tendon Reflexes: Reflexes normal    Psychiatric:         Mood and Affect: Mood normal          Behavior: Behavior normal          Thought Content:  Thought content normal          Judgment: Judgment normal

## 2021-04-05 NOTE — PROGRESS NOTES
Assessment/Plan:       No problem-specific Assessment & Plan notes found for this encounter  Diagnoses and all orders for this visit:    Well adult exam  Comments:  to follow with Ophthalmology    Prediabetes  Comments: Follow with 1800 calorie diet  Advised to lose weight    Blood pressure elevated without history of HTN  Comments:  Blood  pressure today is normal    Polycystic ovarian disease  Comments:  Gyn office visit today noted    Weight gain  Comments:  Diet discussed recommend referral to weight management  Patient was referred by Dr Bibiana El    Morbid obesity [facility])  Comments:  refer to weight management    Left breast lump  -     Ambulatory Referral to Breast Surgery; Future    Pure hypercholesterolemia  Comments: To follow with low-fat diet    Abnormal blood chemistry  Comments:  Low anion gap    Microcytic anemia  -     TIBC; Future  -     Iron Saturation %; Future  -     Iron; Future  -     Ferritin; Future  -     Hemoglobin Electrophoresis; Future    Elevated testosterone level  Comments:  Patient going to have pelvic ultrasound today  Following with gyn    Elevated DHEA ([address] )  Comments:  Patient is going to have pelvic ultrasound today  Following with gyn    Encounter for screening for HIV  Comments:  Verbal consent obtained     patient  Orders:  -     HIV 1/2 Antigen/Antibody (4th Generation) w Reflex [facility]; Future    Immunization counseling  Comments:  Recommend COVID vaccine  Bring immunization list to review and update        Patient Instructions   Follow up with test results      Orders Placed This Encounter   Procedures    TIBC     Standing Status:   Future     Standing Expiration Date:   4/5/2022    Iron Saturation %     Standing Status:   Future     Standing Expiration Date:   4/5/2022    Iron     Standing Status:   Future     Standing Expiration Date:   4/5/2022    Ferritin     Standing Status:   Future     Standing Expiration Date:   4/5/2022    Hemoglobin Electrophoresis Standing Status:   Future     Standing Expiration Date:   4/5/2022    HIV 1/2 Antigen/Antibody (4th Generation) w Reflex [facility]     Standing Status:   Future     Standing Expiration Date:   4/5/2022     Order Specific Question:   Release to patient through [employer]     Answer:   Immediate    Ambulatory Referral to Breast Surgery     Standing Status:   Future     Standing Expiration Date:   4/5/2022     Referral Priority:   ASAP     Referral Type:   Auth/Referral     Referral Reason:   Specialty Services Required     Referred to Provider:   Oscar Ontiveros MD     Requested Specialty:   Breast Surgery     Number of Visits Requested:   1     Expiration Date:   4/5/2022         Subjective:     Patient ID: Terrance Hopson is a 31 y o  female      HPI   Patient does not speak [employer] Dasilva MA translated to patient  Polycystic ovarian disease  She did see Dr Karley Campos  She placed her on Glucophage  She is going to have pelvic ultrasound today  Elevated blood sugar  Denied polyuria or polydipsia admit to regular sweet and carbohydrate intake  Elevated blood pressure  Denied flushing, abdominal pain or hematuria  Obesity  Patient does not exercise or watch her diet  Left breast lump  Same  Test results  Left breast us  Lab done on April 2, 2021  Discussed result with patient    Review of Systems   Constitutional: Negative for chills, diaphoresis and fatigue  HENT: Negative for ear pain, sore throat, trouble swallowing and voice change  Eyes: Negative for visual disturbance  Respiratory: Negative for cough, chest tightness and shortness of breath  Cardiovascular: Negative for chest pain, palpitations and leg swelling  Gastrointestinal: Negative for abdominal pain, blood in stool, constipation, diarrhea and nausea  Endocrine: Negative for polydipsia and polyuria  Genitourinary: Negative for dysuria, flank pain, frequency, hematuria, pelvic pain, urgency, vaginal bleeding and vaginal discharge  Musculoskeletal: Negative for arthralgias, back pain, gait problem, myalgias and neck pain  Skin: Negative for rash  Allergic/Immunologic: Negative for food allergies  Neurological: Negative for dizziness, tremors, seizures, weakness, light-headedness, numbness and headaches  Hematological: Negative for adenopathy  Does not bruise/bleed easily  Psychiatric/Behavioral: Negative for confusion and dysphoric mood  The patient is not nervous/anxious  Objective:     Physical Exam  Constitutional:       General: She is not in acute distress  Appearance: Normal appearance  She is well-developed  She is not ill-appearing  HENT:      Head: Normocephalic and atraumatic  Eyes:      General: No scleral icterus  Extraocular Movements: Extraocular movements intact  Conjunctiva/sclera: Conjunctivae normal       Pupils: Pupils are equal, round, and reactive to light  Neck:      Thyroid: No thyromegaly  Vascular: No JVD  Cardiovascular:      Rate and Rhythm: Normal rate and regular rhythm  Heart sounds: Normal heart sounds  No murmur  Comments: Extremities  No edema  Pulmonary:      Effort: Pulmonary effort is normal       Breath sounds: Normal breath sounds  Abdominal:      Palpations: Abdomen is soft  There is no mass  Tenderness: There is no abdominal tenderness  There is no guarding or rebound  Hernia: No hernia is present  Musculoskeletal:      Right lower leg: No edema  Left lower leg: No edema  Lymphadenopathy:      Cervical: No cervical adenopathy  Skin:     Coloration: Skin is not pale  Findings: No rash  Neurological:      Mental Status: She is alert and oriented to person, place, and time  Cranial Nerves: No cranial nerve deficit  Motor: No abnormal muscle tone        Coordination: Coordination normal       Gait: Gait normal    Psychiatric:         Mood and Affect: Mood normal          Behavior: Behavior normal          Thought Content:  Thought content normal

## 2021-04-16 ENCOUNTER — TELEPHONE (OUTPATIENT)
Dept: SURGICAL ONCOLOGY | Facility: CLINIC | Age: 26
End: 2021-04-16

## 2021-04-16 NOTE — TELEPHONE ENCOUNTER
New Patient Breast Form   Patient Details:     John Cornejo     1995     [id number]     Background Information:   [facility] AT [place] starts by opening a telephone encounter and gathering the following information   Who is calling to schedule and relationship? self   Referring Provider Jaswinder Leon   To which speciality is the referral?  Surgical Oncology   Reason for Visit? Left breast lump   Tumor type none   Is there a confimed diagnosis from biopsy/tissue reviewed by Pathology? No   Date of Tissue Diagnosis (If done outside of [facility] please obtain report and slides)    Is patient aware of diagnosis, and who made them aware? If no tissue diagnosis, please stop and discuss with Navigator prior to scheduling   When was the diagnosis made? 3/2021   Were outside slides requested  No   If biopsy done externally, obtain reports and slides for internal review   Have you had any imaging or labs done? Yes   If YES, when and  where was the blood work done? sl   If outside of [facility] obtain records   Was the patient told to bring a disk? No   Are records in EPIC? Yes   Is there a personal history of cancer? No   If YES please list type and YR diagnosed    If patient has a prior history of breast cancer were old records obtained? No   Have you ever had genetic testing done for breast cancer? If so, can you please bring a copy to appointment for timely treatment planning No   Is there a family history of cancer? No   If YES please list type    Scheduling Information:   [facility]   Are there any days the patient cannot be seen? How was New Patient Packet Sent? [employer] Postal [employer]   Miscellaneous:   After completing the above information, please route to finance, nurse navigation and clinical trials for review

## 2021-06-16 ENCOUNTER — OFFICE VISIT (OUTPATIENT)
Dept: FAMILY MEDICINE CLINIC | Facility: CLINIC | Age: 26
End: 2021-06-16
Payer: COMMERCIAL

## 2021-06-16 VITALS
HEART RATE: 100 BPM | HEIGHT: 64 IN | OXYGEN SATURATION: 96 % | BODY MASS INDEX: 47.22 KG/M2 | WEIGHT: 276.6 LBS | TEMPERATURE: 97.6 F | SYSTOLIC BLOOD PRESSURE: 120 MMHG | DIASTOLIC BLOOD PRESSURE: 80 MMHG | RESPIRATION RATE: 20 BRPM

## 2021-06-16 DIAGNOSIS — N92.6 IRREGULAR MENSTRUAL CYCLE: ICD-10-CM

## 2021-06-16 DIAGNOSIS — N63.20 LEFT BREAST LUMP: Primary | ICD-10-CM

## 2021-06-16 DIAGNOSIS — R03.0 BLOOD PRESSURE ELEVATED WITHOUT HISTORY OF HTN: ICD-10-CM

## 2021-06-16 DIAGNOSIS — D50.9 MICROCYTIC ANEMIA: ICD-10-CM

## 2021-06-16 LAB — SL AMB POCT URINE HCG: NEGATIVE

## 2021-06-16 PROCEDURE — 99214 OFFICE O/P EST MOD 30 MIN: CPT | Performed by: FAMILY MEDICINE

## 2021-06-16 PROCEDURE — 81025 URINE PREGNANCY TEST: CPT | Performed by: FAMILY MEDICINE

## 2021-06-16 PROCEDURE — 3008F BODY MASS INDEX DOCD: CPT | Performed by: FAMILY MEDICINE

## 2021-06-16 RX ORDER — CEPHALEXIN 500 MG/1
500 CAPSULE ORAL EVERY 8 HOURS SCHEDULED
Qty: 21 CAPSULE | Refills: 0 | Status: SHIPPED | OUTPATIENT
Start: 2021-06-16 | End: 2021-06-23

## 2021-06-16 NOTE — PROGRESS NOTES
Assessment/Plan:       No problem-specific Assessment & Plan notes found for this encounter  Diagnoses and all orders for this visit:    Left breast lump  Comments:  Most likely patient has an infected cyst advised to call if symptoms worse or if she develops other symptoms  Advised to call the surgeon to be seen  Orders:  -     cephalexin (KEFLEX) 500 mg capsule; Take 1 capsule (500 mg total) by mouth every 8 (eight) hours for 7 days    Blood pressure elevated without history of HTN  Comments:  Most likely secondary to her weight  Advised to lose weight  Recheck blood pressure with next office visit office visit    Microcytic anemia  Comments:  check iron study , order exist  Orders:  -     CBC and differential; Future    Irregular menstrual cycle  Comments: Follows with gyn  Orders:  -     POCT urine HCG        Patient Instructions   To follow with trest results      Orders Placed This Encounter   Procedures    CBC and differential     This is a patient instruction: This test is non-fasting  Please drink two glasses of water morning of bloodwork  Standing Status:   Future     Standing Expiration Date:   6/16/2022    POCT urine HCG         Subjective:     Patient ID: Donte Woo is a 31 y o  female      HPI  Breast lump , same , however 5 6 days ago  The lump turn red and then opened up draining     Patient stated had similar symptoms before  Patient was scheduled to see breast surgeon in July she said that was the earliest appointment she can get  Patient denied fever or chills    Elevated blood pressure  Denied headache, flushing or dizziness,   Microscopic anemia  Denied rectal bleeding or melena  Patient has irregular menstrual   Since start menstruating  She is following with gyn  Last time she had her period was 2-3 months ago  Usually she gets her    Irregularly  Patient stated she is sexually active the same sex    Review of Systems   Constitutional: Negative for chills, diaphoresis and fatigue  HENT: Negative for ear pain, sore throat, trouble swallowing and voice change  Eyes: Negative for visual disturbance  Respiratory: Negative for cough, chest tightness and shortness of breath  Cardiovascular: Negative for chest pain, palpitations and leg swelling  Gastrointestinal: Negative for abdominal pain, blood in stool, constipation, diarrhea and nausea  Endocrine: Negative for polydipsia and polyuria  Genitourinary: Negative for dysuria, flank pain, frequency, hematuria, pelvic pain, urgency, vaginal bleeding and vaginal discharge  Musculoskeletal: Negative for arthralgias, back pain, gait problem, myalgias and neck pain  Skin: Negative for rash  Allergic/Immunologic: Negative for food allergies  Neurological: Negative for dizziness, tremors, seizures, weakness, light-headedness, numbness and headaches  Hematological: Negative for adenopathy  Does not bruise/bleed easily  Psychiatric/Behavioral: Negative for confusion and dysphoric mood  The patient is not nervous/anxious  Objective:     Physical Exam  Constitutional:       General: She is not in acute distress  Appearance: Normal appearance  She is well-developed  She is not ill-appearing or diaphoretic  HENT:      Head: Normocephalic and atraumatic  Eyes:      General: No scleral icterus  Conjunctiva/sclera: Conjunctivae normal       Pupils: Pupils are equal, round, and reactive to light  Neck:      Thyroid: No thyromegaly  Vascular: No carotid bruit or JVD  Cardiovascular:      Rate and Rhythm: Normal rate and regular rhythm  Heart sounds: Normal heart sounds  No murmur heard  Comments: Extremities  No edema  Pulmonary:      Effort: Pulmonary effort is normal       Breath sounds: Normal breath sounds  Comments: left breast   There is firm lump about 1 and half by 1-1/2 inch with opening in the middle and draining blood  Slightly tender to touch    And slightly red   Located at medial part of her breast  Patient was exam examined with the MA presence in the room  Abdominal:      Palpations: Abdomen is soft  There is no mass  Tenderness: There is no abdominal tenderness  There is no guarding or rebound  Hernia: No hernia is present  Musculoskeletal:      Right lower leg: No edema  Left lower leg: No edema  Lymphadenopathy:      Cervical: No cervical adenopathy  Skin:     Findings: No rash  Neurological:      Mental Status: She is alert and oriented to person, place, and time  Cranial Nerves: No cranial nerve deficit  Sensory: No sensory deficit  Motor: No weakness or abnormal muscle tone  Coordination: Coordination normal       Gait: Gait normal    Psychiatric:         Mood and Affect: Mood normal          Behavior: Behavior normal          Thought Content:  Thought content normal          Judgment: Judgment normal

## 2021-06-18 PROBLEM — D50.9 MICROCYTIC ANEMIA: Status: ACTIVE | Noted: 2021-06-18

## 2021-06-18 PROBLEM — N63.20 LEFT BREAST LUMP: Status: ACTIVE | Noted: 2021-06-18

## 2021-06-18 PROBLEM — N92.6 IRREGULAR MENSTRUAL CYCLE: Status: ACTIVE | Noted: 2021-06-18

## 2021-06-21 ENCOUNTER — OFFICE VISIT (OUTPATIENT)
Dept: FAMILY MEDICINE CLINIC | Facility: CLINIC | Age: 26
End: 2021-06-21
Payer: COMMERCIAL

## 2021-06-21 VITALS
DIASTOLIC BLOOD PRESSURE: 90 MMHG | SYSTOLIC BLOOD PRESSURE: 122 MMHG | HEIGHT: 64 IN | TEMPERATURE: 97.6 F | OXYGEN SATURATION: 100 % | RESPIRATION RATE: 16 BRPM | WEIGHT: 278 LBS | BODY MASS INDEX: 47.46 KG/M2 | HEART RATE: 81 BPM

## 2021-06-21 DIAGNOSIS — R03.0 BLOOD PRESSURE ELEVATED WITHOUT HISTORY OF HTN: ICD-10-CM

## 2021-06-21 DIAGNOSIS — N63.20 LEFT BREAST LUMP: Primary | ICD-10-CM

## 2021-06-21 PROCEDURE — 99213 OFFICE O/P EST LOW 20 MIN: CPT | Performed by: FAMILY MEDICINE

## 2021-06-21 NOTE — PROGRESS NOTES
Assessment/Plan:       No problem-specific Assessment & Plan notes found for this encounter  Diagnoses and all orders for this visit:    Left breast lump  Comments:  Most likely patient has an infected cyst   It is improving  To continue antibiotic  To call if any further problem to follow with the surgeon    Blood pressure elevated without history of HTN  Comments:  Recheck blood pressure with next office visit, in 1 week  Advised to lose weight        There are no Patient Instructions on file for this visit  No orders of the defined types were placed in this encounter  Subjective:     Patient ID: Missy Zee is a 31 y o  female      HPI   Patient is here to check abscess left breast   Patient stated the abscess is much smaller  Not painful  Still draining little bit  Taking Keflex  Denied side effect  Denied fever or chills  She said she called the surgeon, not able to see her sooner  Elevated blood pressure  Denied flushing, dizziness or headache     no labs done   Review of Systems   Constitutional: Negative for chills, diaphoresis and fatigue  HENT: Negative for ear pain, sore throat, trouble swallowing and voice change  Eyes: Negative for visual disturbance  Respiratory: Negative for cough, chest tightness and shortness of breath  Cardiovascular: Negative for chest pain, palpitations and leg swelling  Gastrointestinal: Negative for abdominal pain, blood in stool, constipation, diarrhea and nausea  Endocrine: Negative for polydipsia and polyuria  Genitourinary: Negative for dysuria, flank pain, frequency, hematuria, pelvic pain, urgency, vaginal bleeding and vaginal discharge  Musculoskeletal: Negative for arthralgias, back pain, gait problem, myalgias and neck pain  Skin: Negative for rash  Allergic/Immunologic: Negative for food allergies  Neurological: Negative for dizziness, tremors, seizures, weakness, light-headedness, numbness and headaches  Hematological: Negative for adenopathy  Does not bruise/bleed easily  Psychiatric/Behavioral: Negative for confusion and dysphoric mood  The patient is not nervous/anxious  Objective:     Physical Exam  Nursing note reviewed  Constitutional:       General: She is not in acute distress  Appearance: Normal appearance  She is well-developed  HENT:      Head: Normocephalic  Eyes:      General: No scleral icterus  Extraocular Movements: Extraocular movements intact  Conjunctiva/sclera: Conjunctivae normal    Neck:      Thyroid: No thyromegaly  Vascular: No carotid bruit  Cardiovascular:      Rate and Rhythm: Normal rate and regular rhythm  Heart sounds: Normal heart sounds  Pulmonary:      Effort: Pulmonary effort is normal       Breath sounds: Normal breath sounds  Comments: Left breast   Lump at the medial breast much smaller than last time , about 3/4 x 3/4 fine still open and draining  Not red ,not tender  Abdominal:      General: Bowel sounds are normal  There is no distension  Palpations: Abdomen is soft  There is no mass  Tenderness: There is no abdominal tenderness  There is no guarding or rebound  Hernia: No hernia is present  Musculoskeletal:         General: No swelling  Cervical back: Neck supple  Right lower leg: No edema  Left lower leg: No edema  Lymphadenopathy:      Cervical: No cervical adenopathy  Skin:     Findings: No rash  Neurological:      Mental Status: She is alert and oriented to person, place, and time  Cranial Nerves: No cranial nerve deficit  Motor: No abnormal muscle tone  Coordination: Coordination normal       Gait: Gait normal    Psychiatric:         Mood and Affect: Mood normal          Behavior: Behavior normal          Thought Content:  Thought content normal

## 2021-06-28 ENCOUNTER — OFFICE VISIT (OUTPATIENT)
Dept: FAMILY MEDICINE CLINIC | Facility: CLINIC | Age: 26
End: 2021-06-28
Payer: COMMERCIAL

## 2021-06-28 VITALS
HEART RATE: 83 BPM | OXYGEN SATURATION: 99 % | WEIGHT: 279.4 LBS | HEIGHT: 64 IN | SYSTOLIC BLOOD PRESSURE: 132 MMHG | DIASTOLIC BLOOD PRESSURE: 68 MMHG | TEMPERATURE: 97.3 F | BODY MASS INDEX: 47.7 KG/M2

## 2021-06-28 DIAGNOSIS — I10 HYPERTENSION, UNSPECIFIED TYPE: ICD-10-CM

## 2021-06-28 DIAGNOSIS — N63.20 LEFT BREAST LUMP: Primary | ICD-10-CM

## 2021-06-28 PROCEDURE — 99213 OFFICE O/P EST LOW 20 MIN: CPT | Performed by: FAMILY MEDICINE

## 2021-06-28 PROCEDURE — 1036F TOBACCO NON-USER: CPT | Performed by: FAMILY MEDICINE

## 2021-06-28 PROCEDURE — 3008F BODY MASS INDEX DOCD: CPT | Performed by: FAMILY MEDICINE

## 2021-06-28 NOTE — PROGRESS NOTES
Assessment/Plan:       No problem-specific Assessment & Plan notes found for this encounter  Diagnoses and all orders for this visit:    Left breast lump  Comments:  Infection resolved  To follow with the breast surgeon    Hypertension, unspecified type  Comments:  ? elevated BP end to Norethindron   Advised to decrease caffeine intake  Advised to lose weight  T/C further evaluation to rule out secondary hypertension  Orders:  -     Comprehensive metabolic panel; Future  -     UA w Reflex to Microscopic w Reflex to Culture  -     ECG 12 lead; Future        Patient Instructions   Get test as directed with previous ov   To follow up with test results      Orders Placed This Encounter   Procedures    Comprehensive metabolic panel     This is a patient instruction: Patient fasting for 8 hours or longer recommended  Standing Status:   Future     Standing Expiration Date:   6/28/2022    UA w Reflex to Microscopic w Reflex to Culture    ECG 12 lead     Standing Status:   Future     Standing Expiration Date:   6/28/2022         Subjective:     Patient ID: Haritha Frank is a 31 y o  female      HPI  Patient is here for follow-up  Abscess left breast   Patient stated she finished Keflex yesterday  Redness resolved  Lump resolved she is back to her for firm area she had before her lump developed  No drainage  No fever no chills  Her appointment with breast surgeon is end of July  Hypertension  Denied headache dizziness flushing  She does not eat too much salt  But admit to increased caffeine intake  No test done  Review of Systems   Constitutional: Negative for fatigue  HENT: Negative for sore throat  Eyes: Negative for discharge and visual disturbance  Respiratory: Negative for cough and shortness of breath  Cardiovascular: Negative for chest pain, palpitations and leg swelling  Gastrointestinal: Negative for abdominal pain, blood in stool, constipation and diarrhea     Genitourinary: Negative for frequency and hematuria  Musculoskeletal: Negative for gait problem and myalgias  Neurological: Negative for dizziness and light-headedness  Psychiatric/Behavioral: Negative for behavioral problems  Objective:     Physical Exam  Nursing note reviewed  Constitutional:       General: She is not in acute distress  Appearance: Normal appearance  She is well-developed  HENT:      Head: Normocephalic  Eyes:      General: No scleral icterus  Extraocular Movements: Extraocular movements intact  Conjunctiva/sclera: Conjunctivae normal    Neck:      Thyroid: No thyromegaly  Vascular: No carotid bruit  Cardiovascular:      Rate and Rhythm: Normal rate and regular rhythm  Heart sounds: Normal heart sounds  Pulmonary:      Effort: Pulmonary effort is normal       Breath sounds: Normal breath sounds  Comments: Breast exam   Opening of the lump has healed with a small scab  No tenderness  No redness  Patient has firmness in the medial part of the breast similar or the same as she had before her abscess developed  No nipple changes or discharge  Negative axillary lymph node  Abdominal:      General: Bowel sounds are normal  There is no distension  Palpations: Abdomen is soft  There is no mass  Tenderness: There is no abdominal tenderness  There is no guarding or rebound  Hernia: No hernia is present  Musculoskeletal:         General: No swelling  Cervical back: Neck supple  Right lower leg: No edema  Left lower leg: No edema  Lymphadenopathy:      Cervical: No cervical adenopathy  Skin:     Findings: No rash  Neurological:      Mental Status: She is alert and oriented to person, place, and time  Cranial Nerves: No cranial nerve deficit  Motor: No abnormal muscle tone        Coordination: Coordination normal       Gait: Gait normal    Psychiatric:         Mood and Affect: Mood normal          Behavior: Behavior normal  Thought Content:  Thought content normal

## 2021-06-29 ENCOUNTER — TELEPHONE (OUTPATIENT)
Dept: FAMILY MEDICINE CLINIC | Facility: CLINIC | Age: 26
End: 2021-06-29

## 2021-06-29 ENCOUNTER — TELEPHONE (OUTPATIENT)
Dept: HEMATOLOGY ONCOLOGY | Facility: CLINIC | Age: 26
End: 2021-06-29

## 2021-06-29 NOTE — TELEPHONE ENCOUNTER
I called Surg oncology as per Dr Pedro Miramontes to see about getting the July 26 appt moved up to be seen sooner  The office said they would do what they can and contact the patient  They will have to check with scheduling to see what they can do

## 2021-06-29 NOTE — TELEPHONE ENCOUNTER
Ning from [address] [place] [address] in [place] called and would like a sooner appointment for this patient if possible  I did not see anything obviously available on [employer] calendar at [facility]  The best call back for the patient is [phone]

## 2021-07-02 NOTE — TELEPHONE ENCOUNTER
Patient stated that she would prefer to see someone sooner and is requesting a referral to a general surgeon

## 2021-07-21 ENCOUNTER — OFFICE VISIT (OUTPATIENT)
Dept: FAMILY MEDICINE CLINIC | Facility: CLINIC | Age: 26
End: 2021-07-21
Payer: COMMERCIAL

## 2021-07-21 VITALS
OXYGEN SATURATION: 98 % | BODY MASS INDEX: 47.8 KG/M2 | DIASTOLIC BLOOD PRESSURE: 78 MMHG | HEIGHT: 64 IN | TEMPERATURE: 97.8 F | SYSTOLIC BLOOD PRESSURE: 120 MMHG | WEIGHT: 280 LBS | HEART RATE: 94 BPM

## 2021-07-21 DIAGNOSIS — I10 HYPERTENSION, UNSPECIFIED TYPE: ICD-10-CM

## 2021-07-21 DIAGNOSIS — Z11.59 NEED FOR HEPATITIS C SCREENING TEST: ICD-10-CM

## 2021-07-21 DIAGNOSIS — N63.20 LEFT BREAST LUMP: Primary | ICD-10-CM

## 2021-07-21 DIAGNOSIS — D50.9 MICROCYTIC ANEMIA: ICD-10-CM

## 2021-07-21 DIAGNOSIS — N92.6 IRREGULAR MENSTRUAL CYCLE: ICD-10-CM

## 2021-07-21 DIAGNOSIS — E66.01 MORBID OBESITY (HCC): ICD-10-CM

## 2021-07-21 PROCEDURE — 99214 OFFICE O/P EST MOD 30 MIN: CPT | Performed by: FAMILY MEDICINE

## 2021-07-21 RX ORDER — CEPHALEXIN 500 MG/1
500 CAPSULE ORAL EVERY 8 HOURS SCHEDULED
Qty: 21 CAPSULE | Refills: 0 | Status: SHIPPED | OUTPATIENT
Start: 2021-07-21 | End: 2021-07-28

## 2021-07-21 NOTE — PROGRESS NOTES
Assessment/Plan:       No problem-specific Assessment & Plan notes found for this encounter  Diagnoses and all orders for this visit:    Left breast lump  Comments:  Recurrent abscess  Keep appointment with with breast surgeon next week  Call if she developed redness or fever/chills    Orders:  -     cephalexin (KEFLEX) 500 mg capsule; Take 1 capsule (500 mg total) by mouth every 8 (eight) hours for 7 days    Hypertension, unspecified type  Comments:  Check blood pressure is normal advised to decrease soda intake  To lose weight  Check CMP, UA CBC and EKG  Order exit    Microcytic anemia  Comments:  Check CBC , hemoglobin electrophoresis and iron study  Order exist   Orders printed for patient exam    Need for hepatitis C screening test  -     Hepatitis C antibody; Future    Irregular menstrual cycle  Comments: Follows with gyn    Morbid obesity ([facility] [address] )  -     Ambulatory referral to Weight Management; Future        Patient Instructions   To follow up with test results      Orders Placed This Encounter   Procedures    Hepatitis C antibody     Standing Status:   Future     Standing Expiration Date:   7/21/2022    Ambulatory referral to Weight Management     Standing Status:   Future     Standing Expiration Date:   7/21/2022     Referral Priority:   Routine     Referral Type:   Consult - AMB     Referral Reason:   Specialty Services Required     Requested Specialty:   Bariatrics     Number of Visits Requested:   1     Expiration Date:   7/21/2022         Subjective:     Patient ID: Gregorio Croft is a 31 y o  female      HPI  Follow-up  Hypertension  Denied headache flushing or dizziness  Denied high salt intake  Denied high coffee intake but she drinks a soda a lot  Breast lump, patient stated she lump came back yesterday   Draining little blood  Slightly tender to touch  No fever or chills  No breast pain  Anemia   Denied melena, rectal bleeding  No test done since last office      All  orders from previous office visits printed again and given to patient  Review of Systems   Constitutional: Negative for activity change, appetite change, chills, fatigue, fever and unexpected weight change  HENT: Negative for congestion, ear discharge, ear pain, hearing loss, nosebleeds, rhinorrhea, sinus pressure, sore throat, tinnitus, trouble swallowing and voice change  Eyes: Negative for photophobia, pain and visual disturbance  Respiratory: Negative for cough, chest tightness, shortness of breath and wheezing  Cardiovascular: Negative for chest pain, palpitations and leg swelling  Gastrointestinal: Negative for abdominal pain, anal bleeding, blood in stool, constipation, diarrhea, nausea and vomiting  Endocrine: Negative for cold intolerance, heat intolerance, polydipsia and polyuria  Genitourinary: Negative for dysuria, frequency, hematuria, urgency, vaginal bleeding, vaginal discharge and vaginal pain  Musculoskeletal: Negative for arthralgias, back pain, gait problem, joint swelling, myalgias and neck pain  Skin: Negative for rash  Neurological: Negative for dizziness, tremors, seizures, syncope, weakness, light-headedness and headaches  Hematological: Negative for adenopathy  Does not bruise/bleed easily  Psychiatric/Behavioral: Negative for agitation, behavioral problems, confusion, dysphoric mood, hallucinations and sleep disturbance  The patient is not nervous/anxious  Objective:     Physical Exam  Nursing note reviewed  Constitutional:       General: She is not in acute distress  Appearance: Normal appearance  She is well-developed  She is not ill-appearing  HENT:      Head: Normocephalic  Eyes:      General: No scleral icterus  Extraocular Movements: Extraocular movements intact  Conjunctiva/sclera: Conjunctivae normal    Neck:      Thyroid: No thyromegaly  Vascular: No carotid bruit     Cardiovascular:      Rate and Rhythm: Normal rate and regular rhythm  Heart sounds: Normal heart sounds  Pulmonary:      Effort: Pulmonary effort is normal       Breath sounds: Normal breath sounds  Abdominal:      General: Bowel sounds are normal  There is no distension  Palpations: Abdomen is soft  There is no mass  Tenderness: There is no abdominal tenderness  There is no guarding or rebound  Hernia: No hernia is present  Genitourinary:     Comments: Left breast   There is a firm subcutauous lump with 2 opening draining blood and slight pusy discharge seen on the Band-Aid  Located at the medial part of the left breast measure about 1  25x 1 25 in   Slightly tender to touch  No redness  Musculoskeletal:         General: No swelling  Cervical back: Neck supple  Right lower leg: No edema  Left lower leg: No edema  Lymphadenopathy:      Cervical: No cervical adenopathy  Skin:     Coloration: Skin is not pale  Findings: No rash  Neurological:      General: No focal deficit present  Mental Status: She is alert and oriented to person, place, and time  Cranial Nerves: No cranial nerve deficit  Motor: No weakness or abnormal muscle tone  Coordination: Coordination normal       Gait: Gait normal    Psychiatric:         Mood and Affect: Mood normal          Behavior: Behavior normal          Thought Content:  Thought content normal

## 2021-07-23 ENCOUNTER — TELEPHONE (OUTPATIENT)
Dept: SURGICAL ONCOLOGY | Facility: CLINIC | Age: 26
End: 2021-07-23

## 2021-07-24 ENCOUNTER — LAB (OUTPATIENT)
Dept: LAB | Facility: MEDICAL CENTER | Age: 26
End: 2021-07-24
Payer: COMMERCIAL

## 2021-07-24 ENCOUNTER — CLINICAL SUPPORT (OUTPATIENT)
Dept: URGENT CARE | Facility: MEDICAL CENTER | Age: 26
End: 2021-07-24
Payer: COMMERCIAL

## 2021-07-24 DIAGNOSIS — I10 HYPERTENSION, UNSPECIFIED TYPE: ICD-10-CM

## 2021-07-24 DIAGNOSIS — Z11.4 ENCOUNTER FOR SCREENING FOR HIV: ICD-10-CM

## 2021-07-24 DIAGNOSIS — Z11.59 NEED FOR HEPATITIS C SCREENING TEST: ICD-10-CM

## 2021-07-24 DIAGNOSIS — D50.9 MICROCYTIC ANEMIA: ICD-10-CM

## 2021-07-24 LAB
ALBUMIN SERPL BCP-MCNC: 3.6 G/DL (ref 3.5–5)
ALP SERPL-CCNC: 74 U/L (ref 46–116)
ALT SERPL W P-5'-P-CCNC: 21 U/L (ref 12–78)
ANION GAP SERPL CALCULATED.3IONS-SCNC: 5 MMOL/L (ref 4–13)
AST SERPL W P-5'-P-CCNC: 12 U/L (ref 5–45)
ATRIAL RATE: 66 BPM
BASOPHILS # BLD AUTO: 0.01 THOUSANDS/ΜL (ref 0–0.1)
BASOPHILS NFR BLD AUTO: 0 % (ref 0–1)
BILIRUB SERPL-MCNC: 0.6 MG/DL (ref 0.2–1)
BUN SERPL-MCNC: 10 MG/DL (ref 5–25)
CALCIUM SERPL-MCNC: 9.2 MG/DL (ref 8.3–10.1)
CHLORIDE SERPL-SCNC: 105 MMOL/L (ref 100–108)
CO2 SERPL-SCNC: 28 MMOL/L (ref 21–32)
CREAT SERPL-MCNC: 0.68 MG/DL (ref 0.6–1.3)
EOSINOPHIL # BLD AUTO: 0.13 THOUSAND/ΜL (ref 0–0.61)
EOSINOPHIL NFR BLD AUTO: 3 % (ref 0–6)
ERYTHROCYTE [DISTWIDTH] IN BLOOD BY AUTOMATED COUNT: 17.2 % (ref 11.6–15.1)
FERRITIN SERPL-MCNC: 15 NG/ML (ref 8–388)
GFR SERPL CREATININE-BSD FRML MDRD: 120 ML/MIN/1.73SQ M
GLUCOSE P FAST SERPL-MCNC: 87 MG/DL (ref 65–99)
HCT VFR BLD AUTO: 36.5 % (ref 34.8–46.1)
HCV AB SER QL: NORMAL
HGB BLD-MCNC: 10.5 G/DL (ref 11.5–15.4)
IMM GRANULOCYTES # BLD AUTO: 0.01 THOUSAND/UL (ref 0–0.2)
IMM GRANULOCYTES NFR BLD AUTO: 0 % (ref 0–2)
IRON SATN MFR SERPL: 6 %
IRON SERPL-MCNC: 21 UG/DL (ref 50–170)
LYMPHOCYTES # BLD AUTO: 1.77 THOUSANDS/ΜL (ref 0.6–4.47)
LYMPHOCYTES NFR BLD AUTO: 37 % (ref 14–44)
MCH RBC QN AUTO: 21.5 PG (ref 26.8–34.3)
MCHC RBC AUTO-ENTMCNC: 28.8 G/DL (ref 31.4–37.4)
MCV RBC AUTO: 75 FL (ref 82–98)
MONOCYTES # BLD AUTO: 0.39 THOUSAND/ΜL (ref 0.17–1.22)
MONOCYTES NFR BLD AUTO: 8 % (ref 4–12)
NEUTROPHILS # BLD AUTO: 2.47 THOUSANDS/ΜL (ref 1.85–7.62)
NEUTS SEG NFR BLD AUTO: 52 % (ref 43–75)
NRBC BLD AUTO-RTO: 0 /100 WBCS
P AXIS: -7 DEGREES
PLATELET # BLD AUTO: 318 THOUSANDS/UL (ref 149–390)
PMV BLD AUTO: 10.3 FL (ref 8.9–12.7)
POTASSIUM SERPL-SCNC: 4.3 MMOL/L (ref 3.5–5.3)
PR INTERVAL: 132 MS
PROT SERPL-MCNC: 8.3 G/DL (ref 6.4–8.2)
QRS AXIS: 18 DEGREES
QRSD INTERVAL: 96 MS
QT INTERVAL: 396 MS
QTC INTERVAL: 415 MS
RBC # BLD AUTO: 4.89 MILLION/UL (ref 3.81–5.12)
SODIUM SERPL-SCNC: 138 MMOL/L (ref 136–145)
T WAVE AXIS: 7 DEGREES
TIBC SERPL-MCNC: 370 UG/DL (ref 250–450)
VENTRICULAR RATE: 66 BPM
WBC # BLD AUTO: 4.78 THOUSAND/UL (ref 4.31–10.16)

## 2021-07-24 PROCEDURE — 83020 HEMOGLOBIN ELECTROPHORESIS: CPT

## 2021-07-24 PROCEDURE — 87389 HIV-1 AG W/HIV-1&-2 AB AG IA: CPT

## 2021-07-24 PROCEDURE — 86803 HEPATITIS C AB TEST: CPT

## 2021-07-24 PROCEDURE — 93005 ELECTROCARDIOGRAM TRACING: CPT

## 2021-07-24 PROCEDURE — 36415 COLL VENOUS BLD VENIPUNCTURE: CPT

## 2021-07-24 PROCEDURE — 80053 COMPREHEN METABOLIC PANEL: CPT

## 2021-07-24 PROCEDURE — 93010 ELECTROCARDIOGRAM REPORT: CPT | Performed by: INTERNAL MEDICINE

## 2021-07-24 PROCEDURE — 83550 IRON BINDING TEST: CPT

## 2021-07-24 PROCEDURE — 82728 ASSAY OF FERRITIN: CPT

## 2021-07-24 PROCEDURE — 85025 COMPLETE CBC W/AUTO DIFF WBC: CPT

## 2021-07-24 PROCEDURE — 83540 ASSAY OF IRON: CPT

## 2021-07-26 ENCOUNTER — CONSULT (OUTPATIENT)
Dept: SURGICAL ONCOLOGY | Facility: CLINIC | Age: 26
End: 2021-07-26
Payer: COMMERCIAL

## 2021-07-26 VITALS
SYSTOLIC BLOOD PRESSURE: 110 MMHG | HEIGHT: 64 IN | TEMPERATURE: 98.6 F | RESPIRATION RATE: 16 BRPM | DIASTOLIC BLOOD PRESSURE: 78 MMHG | WEIGHT: 278.2 LBS | BODY MASS INDEX: 47.5 KG/M2 | HEART RATE: 82 BPM

## 2021-07-26 DIAGNOSIS — L73.2 HIDRADENITIS SUPPURATIVA: Primary | ICD-10-CM

## 2021-07-26 LAB — HIV 1+2 AB+HIV1 P24 AG SERPL QL IA: NORMAL

## 2021-07-26 PROCEDURE — 99244 OFF/OP CNSLTJ NEW/EST MOD 40: CPT | Performed by: NURSE PRACTITIONER

## 2021-07-26 NOTE — PROGRESS NOTES
Surgical Oncology Follow Up       [facility]  [facility] [address]    [place]  1995  [id number]      Chief Complaint   Patient presents with    Consult   1  Hidradenitis suppurativa  - Continue antibiotic as prescribed by PCP  - Apply warm compresses  - Ambulatory referral to General Surgery; Future      Discussion/Summary:   Patient is a 69-year-old female who presents today for consultation regarding a left breast skin cyst   On her exam she appears to have hidradenitis of bilateral breasts and axillary regions  She reports the skin cyst at medial aspect of the left breast is particularly bothersome and desires to have this surgically excised  She is currently taking antibiotics after a flare-up  of the cyst last week  There is no evidence acute infection at this time  I recommended that she complete her course of oral antibiotics and apply warm compresses to the area  There is no evidence of malignancy based on her clinical exam and breast imaging  I am referring her to general surgery to discussed surgical excision in the future  Patient is it in agreement with this plan  She will follow up with our office on an as-needed basis  All of her and her friend's questions were answered today  History of Present Illness:     -Interval History: Patient is a 69-year-old female who presents today for a consultation regarding a left breast cyst   She states this has been present for 1-2 years and has become infected about 2-3 times requiring antibiotics  She states that she had a flare up last week and is currently taking a course of Keflex  She notes improvement of her symptoms  She had a left breast ultrasound performed in March of 2021 to evaluate this area which revealed a 3 6 cm hypoechoic lesion consistent with a skin cyst or scar  This area is tender and patient wishes to have it surgically excised    She is a nonsmoker  she reports an unknown cancer in a maternal grandfather but reports no other cancers in her family or personal cancers  She is not of Ashkenazi Mormonism descent  Menarche age 12, has never been pregnant  She has used birth control pills and has never used hormone replacement therapy  She consumes alcohol rarely  Referring: Katlyn Kyle MD      Review of Systems:  Review of Systems   Constitutional: Negative for activity change, appetite change, chills, fatigue, fever and unexpected weight change  Respiratory: Negative for cough and shortness of breath  Cardiovascular: Negative for chest pain  Gastrointestinal: Negative for abdominal pain, constipation, diarrhea, nausea and vomiting  Musculoskeletal: Negative for arthralgias, back pain, gait problem and myalgias  Skin: Negative for color change and rash  Neurological: Negative for dizziness and headaches  Hematological: Negative for adenopathy  Psychiatric/Behavioral: Negative for agitation and confusion  All other systems reviewed and are negative  Patient Active Problem List   Diagnosis    Left breast lump    Microcytic anemia    Irregular menstrual cycle     Past Medical History:   Diagnosis Date    Diabetes mellitus ([facility] [address] )     Polycystic ovarian disease      No past surgical history on file    Family History   Problem Relation Age of Onset   [facility] Hypertension Mother     Diabetes Mother     Asthma Mother     Arthritis Mother     Alcohol abuse Father     No Known Problems Sister     No Known Problems Brother     No Known Problems Brother     No Known Problems Brother     No Known Problems Brother     No Known Problems Brother     No Known Problems Brother      Social History     Socioeconomic History    Marital status: Single     Spouse name: Not on file    Number of children: Not on file    Years of education: Not on file    Highest education level: Not on file   Occupational History    Not on file Tobacco Use    Smoking status: Never Smoker    Smokeless tobacco: Never Used   Vaping Use    Vaping Use: Never used   Substance and Sexual Activity    Alcohol use: Not Currently     Alcohol/week: 6 0 standard drinks     Types: 6 Cans of beer per week    Drug use: Never    Sexual activity: Yes     Partners: Female   Other Topics Concern    Not on file   Social History Narrative    Not on file     Social Determinants of Health     Financial Resource Strain:     Difficulty of Paying Living Expenses:    Food Insecurity:     Worried About Running Out of Food in the Last Year:     Ran Out of Food in the Last Year:    Transportation Needs:     Lack of Transportation (Medical):      Lack of Transportation (Non-Medical):    Physical Activity:     Days of Exercise per Week:     Minutes of Exercise per Session:    Stress:     Feeling of Stress :    Social Connections:     Frequency of Communication with Friends and Family:     Frequency of Social Gatherings with Friends and Family:     Attends Christian Services:     Active Member of Clubs or Organizations:     Attends Club or Organization Meetings:     Marital Status:    Intimate Partner Violence:     Fear of Current or Ex-Partner:     Emotionally Abused:     Physically Abused:     Sexually Abused:        Current Outpatient Medications:     cephalexin (KEFLEX) 500 mg capsule, Take 1 capsule (500 mg total) by mouth every 8 (eight) hours for 7 days, Disp: 21 capsule, Rfl: 0    metFORMIN (GLUCOPHAGE) 850 mg tablet, Take 1 tablet (850 mg total) by mouth 2 (two) times a day with meals, Disp: 60 tablet, Rfl: 6    norethindrone (MICRONOR) 0 35 MG tablet, Take 1 tablet (0 35 mg total) by mouth daily, Disp: 28 tablet, Rfl: 3    medroxyPROGESTERone (PROVERA) 10 mg tablet, Take 1 tablet (10 mg total) by mouth daily (Patient not taking: Reported on 6/16/2021), Disp: 14 tablet, Rfl: 0  No Known Allergies  Vitals:    07/26/21 1341   BP: 110/78   Pulse: 82 Resp: 16   Temp: 98 6 °F (37 °C)       Physical Exam  Constitutional:       Appearance: She is obese  Pulmonary:      Effort: Pulmonary effort is normal    Chest:      Breasts:         Right: Skin change present  No swelling, bleeding, inverted nipple, mass, nipple discharge or tenderness  Left: Skin change and tenderness present  No swelling, bleeding, inverted nipple, mass or nipple discharge  Comments: Multiple skin cysts bilaterally- breasts and axillary regions consistent with hidradenitis  There is a healing left medial breast skin cyst   No drainage or erythema noted  This area is tender to the touch  No fluctuance  There are multiple keloid scars noted bilaterally on chest   Lymphadenopathy:      Upper Body:      Right upper body: No supraclavicular or axillary adenopathy  Left upper body: No supraclavicular or axillary adenopathy  Skin:     General: Skin is warm  Neurological:      General: No focal deficit present  Mental Status: She is alert and oriented to person, place, and time  Psychiatric:         Mood and Affect: Mood normal            Results:    Imaging  3/25/21- Left breast ultrasound- 3 6 cm hypoechoic lesion, likely a cyst or scar  BIRADS 2    Advance Care Planning/Advance Directives:  Discussed disease status and treatment goals with the patient

## 2021-07-26 NOTE — PATIENT INSTRUCTIONS
Hidradenitis Suppurativa   WHAT YOU NEED TO KNOW:   Hidradenitis suppurativa (HS) is a chronic (long-term) skin disease that causes your sweat glands or hair follicles to get clogged and inflamed  HS causes red bumps that look like pimples or small boils to develop on your skin  The cause of HS is unknown  It may run in families  Being overweight and smoking worsens signs and symptoms of HS  DISCHARGE INSTRUCTIONS:   Contact your healthcare provider if:   · Your symptoms get worse, even with treatment  · You have questions or concerns about your condition or care  Medicines: You may need any of the following:  · Antibiotics  are used to treat or prevent a bacterial infection  Antibiotics may be used long-term  Antibiotics may be given as a cream or pill  · NSAIDs , such as ibuprofen, help decrease swelling, pain, and fever  This medicine is available with or without a doctor's order  NSAIDs can cause stomach bleeding or kidney problems in certain people  If you take blood thinner medicine, always ask your healthcare provider if NSAIDs are safe for you  Always read the medicine label and follow directions  · Acetaminophen  decreases pain and fever  It is available without a doctor's order  Ask how much to take and how often to take it  Follow directions  Acetaminophen can cause liver damage if not taken correctly  · Other medicines  may be used to treat HS  These may include hormones, acne medicines, steroids, biologic therapy, and medicines that slow your immune system  · Take your medicine as directed  Contact your healthcare provider if you think your medicine is not helping or if you have side effects  Tell him of her if you are allergic to any medicine  Keep a list of the medicines, vitamins, and herbs you take  Include the amounts, and when and why you take them  Bring the list or the pill bottles to follow-up visits  Carry your medicine list with you in case of an emergency      Manage HS symptoms and decrease flare-ups:   · Apply warm, moist compresses  This may help to decrease pain  Keep the compress on your skin for 10 minutes  Sitz baths may be recommended if your genital or anal area is affected by HS  To do a sitz bath, fill a bathtub with 4 to 6 inches of warm water  You may also use a sitz bath pan that fits inside a toilet bowl  Sit in the sitz bath for 15 minutes  Do this 3 times a day, and after each bowel movement  The warm water can help decrease pain and swelling  · Wash your skin gently  Use cleansers recommended by your healthcare provider  Antibacterial soap may be helpful  · Lose weight if you are overweight  Weight loss may help to improve signs and symptoms of HS  · Do not smoke  Smoking can make it more difficult to treat HS and worsen symptoms  Ask your healthcare provider for information if you currently smoke and need help to quit  E-cigarettes or smokeless tobacco still contain nicotine  Talk to your healthcare provider before you use these products  · Do not wear tight clothing  Tight clothing rubs against your skin and causes irritation that can worsen HS  · Do not shave or use deodorant in areas of skin affected by HS  Ask your healthcare provider about safe deodorant or hair removal options  · Keep your skin cool  Overheating and sweating can cause an HS flare-up  · Ask your healthcare provider if you should make any changes to the foods you eat  Your healthcare provider may recommend that you avoid dairy foods  A dairy-free diet may help decrease your symptoms  Dairy foods include milk, cheese, yogurt, and ice cream      · Tell your healthcare provider if HS is causing you to feel depressed  Your healthcare provider may recommend counseling to help you cope with HS  Follow up with your healthcare provider as directed:  Write down your questions so you remember to ask them during your visits     © [address] Information is for End User's use only and may not be sold, redistributed or otherwise used for commercial purposes  All illustrations and images included in CareNotes® are the copyrighted property of A D A M , Inc  or Caroline Oliva  The above information is an  only  It is not intended as medical advice for individual conditions or treatments  Talk to your doctor, nurse or pharmacist before following any medical regimen to see if it is safe and effective for you

## 2021-07-27 NOTE — PROGRESS NOTES
Assessment/Plan:      Diagnoses and all orders for this visit:    Morbid obesity ([facility] )  -     Ambulatory referral to Weight Management    IFG (impaired fasting glucose)      -Discussed options of HealthyCORE-Intensive Lifestyle Intervention Program, Very Low Calorie Diet-VLCD, Conservative Program, Fernanda-En-Y Gastric Bypass and Vertical Sleeve Gastrectomy and the role of weight loss medications   -Initial weight loss goal of 5-10% weight loss for improved health  -Screening labs  -Patient is interested in pursuing Fernanda-En-Y Gastric Bypass and Vertical Sleeve Gastrectomy    Goals:  Food log (ie ) www myfitnesspal com,sparkpeople  com,loseit com,calorieking  com,etc  baritastic  No sugary beverages  At least 64oz of water daily  Increase physical activity by 10 minutes daily  Gradually increase physical activity to a goal of 5 days per week for 30 minutes of MODERATE intensity PLUS 2 days per week of FULL BODY resistance training  45 minute visit, >50% face-to-face time spent counseling patient on surgical and nonsurgical interventions for the treatment of excess weight  Discussed the advantages and long-term outcomes with regards to bariatric surgery  Discussed in detail nonsurgical options including intensive lifestyle intervention program, very low-calorie diet program and conservative program   Discussed the role of weight loss medications  Counseled patient on diet behavior and exercise modification for weight loss  Follow up in approximately 1 month with Surgical Dietician, Surgical  and Surgical   Subjective:   Chief Complaint   Patient presents with    Consult       Patient ID: Garrett Veras  is a 31 y o  female with excess weight/obesity here to pursue weight management      Past Medical History:   Diagnosis Date    Diabetes mellitus ([facility] )     Polycystic ovarian disease        HPI:  Obesity/Excess Weight:  Severity: class 3  Onset:  Since teen    Modifiers: Diet and Exercise  Contributing factors: Poor Food Choices, Insufficient Physical Activity, Stress/Emotional Eating and Lack of knowledge of appropriate lifestyle changes  Associated symptoms: comorbid conditions    Goals: 230lbs  Hydration:  Alcohol: once a week  Smoking: no  Exercise: no  Sleep: 8hr  STOP bang: 3/8    Social:  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    The following portions of the patient's history were reviewed and updated as appropriate: allergies, current medications, past family history, past medical history, past social history, past surgical history and problem list     Review of Systems   Constitutional: Negative for activity change and appetite change  Respiratory: Negative  Cardiovascular: Negative  Gastrointestinal: Negative  Genitourinary: Negative  Musculoskeletal: Negative for arthralgias  Skin: Negative for rash  Psychiatric/Behavioral: Negative  Objective:    /82 (BP Location: Left arm, Patient Position: Sitting, Cuff Size: Adult)   Pulse 85   Temp 97 8 °F (36 6 °C) (Tympanic)   Resp 16   Ht 5' 3 75" (1 619 m)   Wt 125 kg (274 lb 8 oz)   BMI 47 49 kg/m²     Physical Exam     Constitutional   General appearance: Abnormal   well developed and morbidly obese  Eyes No conjunctival pallor     Musculoskeletal   Gait and station: Normal     Psychiatric   Orientation to person, place and time: Normal     Affect: appropriate

## 2021-07-28 ENCOUNTER — CONSULT (OUTPATIENT)
Dept: BARIATRICS | Facility: CLINIC | Age: 26
End: 2021-07-28
Payer: COMMERCIAL

## 2021-07-28 VITALS
BODY MASS INDEX: 46.86 KG/M2 | DIASTOLIC BLOOD PRESSURE: 82 MMHG | SYSTOLIC BLOOD PRESSURE: 124 MMHG | HEART RATE: 85 BPM | TEMPERATURE: 97.8 F | HEIGHT: 64 IN | WEIGHT: 274.5 LBS | RESPIRATION RATE: 16 BRPM

## 2021-07-28 DIAGNOSIS — R73.01 IFG (IMPAIRED FASTING GLUCOSE): ICD-10-CM

## 2021-07-28 DIAGNOSIS — E66.01 MORBID OBESITY (HCC): Primary | ICD-10-CM

## 2021-07-28 LAB
HGB A MFR BLD: 2 % (ref 1.8–3.2)
HGB A MFR BLD: 98 % (ref 96.4–98.8)
HGB F MFR BLD: 0 % (ref 0–2)
HGB FRACT BLD-IMP: NORMAL
HGB S MFR BLD: 0 %

## 2021-07-28 PROCEDURE — 99243 OFF/OP CNSLTJ NEW/EST LOW 30: CPT | Performed by: FAMILY MEDICINE

## 2021-07-29 ENCOUNTER — TELEPHONE (OUTPATIENT)
Dept: FAMILY MEDICINE CLINIC | Facility: CLINIC | Age: 26
End: 2021-07-29

## 2021-07-29 DIAGNOSIS — D50.9 MICROCYTIC ANEMIA: Primary | ICD-10-CM

## 2021-07-29 DIAGNOSIS — R77.9 ELEVATED BLOOD PROTEIN: ICD-10-CM

## 2021-08-09 ENCOUNTER — CONSULT (OUTPATIENT)
Dept: SURGERY | Facility: CLINIC | Age: 26
End: 2021-08-09
Payer: COMMERCIAL

## 2021-08-09 VITALS
HEART RATE: 79 BPM | BODY MASS INDEX: 47.12 KG/M2 | DIASTOLIC BLOOD PRESSURE: 80 MMHG | SYSTOLIC BLOOD PRESSURE: 122 MMHG | WEIGHT: 276 LBS | TEMPERATURE: 97.8 F | HEIGHT: 64 IN

## 2021-08-09 DIAGNOSIS — E11.9 DIABETES (HCC): ICD-10-CM

## 2021-08-09 DIAGNOSIS — E66.01 MORBID OBESITY (HCC): ICD-10-CM

## 2021-08-09 DIAGNOSIS — L73.2 HIDRADENITIS SUPPURATIVA: Primary | ICD-10-CM

## 2021-08-09 PROCEDURE — 99203 OFFICE O/P NEW LOW 30 MIN: CPT | Performed by: SURGERY

## 2021-08-09 PROCEDURE — 3079F DIAST BP 80-89 MM HG: CPT | Performed by: SURGERY

## 2021-08-09 PROCEDURE — 1036F TOBACCO NON-USER: CPT | Performed by: SURGERY

## 2021-08-09 PROCEDURE — 3008F BODY MASS INDEX DOCD: CPT | Performed by: SURGERY

## 2021-08-09 PROCEDURE — 3074F SYST BP LT 130 MM HG: CPT | Performed by: SURGERY

## 2021-08-09 NOTE — PROGRESS NOTES
Assessment/Plan:   Zulema Watts is a 31 y  o female who is here for There were no encounter diagnoses  On exam found to have Hidradenitis  of the : left medial/sternal breast         Hemoglobin A1c 6 0   Morbid obesity  BMI 48  Diabetes    Plan: Excise lesion(s) under anesthesia in the operating room        Patient is aware the high risk of recurrence especially with her multiple risk factors  There is at least a 20-30% chance of recurrence even after excision and wound healing  That recurrence may be in the same location or in different locations  We also gave the patient educational material and discussed hidradenitis as a autoimmune disorder which has a chronicity and is generally not curable but can be often although not always, managed  Patient is planning a trip to [facility] within the next week social schedule in a few weeks when she is back  Physical Exam  Chest:             Positioning: supine    Post Op Pain Management:   Norco    - Patient has been instructed to avoid herbs or non-directed vitamins the week prior to surgery to ensure no drug interactions with perioperative surgical and anesthetic medications  - Patient should continue beta-blocker medication up through and including the day of surgery but hold any other hypertensive medications, including diuretics, unless instructed by PCP or anesthesia  - Patient should continue his statin medication up through and including the day of surgery   - Hold metformin , If on this medication, the morning of surgery and do not resume until 48 hours AFTER surgery to avoid risk of lactic acidosis  Do not resume if eGFR is < 30  - Insulin Management:If on Insulin, patient advised to call PCP for explicit instructions  In general, will need to take one-half normal dose am of surgery but pt advised to consult PCP before making any changes     - Patient has been instructed to avoid aspirin containing medications or non-steroidal anti-inflammatory drugs for SEVEN days preceding surgery  Preoperative Clearance: None          _______________________________________________________  CC:Abscess (L  breast)    HPI:  Garrett Veras is a 31 y  o female who was referred for evaluation of Abscess (L  breast)    Currently patient reports  Recurrent lesions of immediate left breast   Consistent with hidradenitis  Her morbid obesity does not help with the healing  She has seen weight management  She is interested in pursuing the bypass surgery        Reports: growing, painful, draining and infected    Location:   Left breast      ROS:  General ROS: negative  negative for - chills, fatigue, fever or night sweats, weight loss  Respiratory ROS: no cough, shortness of breath, or wheezing  Cardiovascular ROS: no chest pain or dyspnea on exertion  Genito-Urinary ROS: no dysuria, trouble voiding, or hematuria  Musculoskeletal ROS: negative for - gait disturbance, joint pain or muscle pain  Neurological ROS: no TIA or stroke symptoms  Skin ROS: See HPI  GI ROS: see HPI  Skin ROS: no new rashes or lesions   Lymphatic ROS: no new adenopathy noted by pt  GYN ROS: see HPI, no new GYN history or bleeding noted  Psy ROS: no new mental or behavioral disturbances       Patient Active Problem List   Diagnosis    Left breast lump    Microcytic anemia    Irregular menstrual cycle    Hidradenitis suppurativa    Morbid obesity ([facility] )    IFG (impaired fasting glucose)         Allergies:  Patient has no known allergies  Current Outpatient Medications:     metFORMIN (GLUCOPHAGE) 850 mg tablet, Take 1 tablet (850 mg total) by mouth 2 (two) times a day with meals, Disp: 60 tablet, Rfl: 6    norethindrone (MICRONOR) 0 35 MG tablet, Take 1 tablet (0 35 mg total) by mouth daily, Disp: 28 tablet, Rfl: 3    Past Medical History:   Diagnosis Date    Asthma     Diabetes mellitus ([facility] )     Polycystic ovarian disease        History reviewed   No pertinent surgical history  Family History   Problem Relation Age of Onset    Hypertension Mother     Diabetes Mother     Asthma Mother     Arthritis Mother     Alcohol abuse Father     No Known Problems Sister     No Known Problems Brother     No Known Problems Brother     No Known Problems Brother     No Known Problems Brother     No Known Problems Brother     No Known Problems Brother         reports that she has never smoked  She has never used smokeless tobacco  She reports previous alcohol use of about 6 0 standard drinks of alcohol per week  She reports that she does not use drugs  Vitals:    08/09/21 1055   BP: 122/80   Pulse: 79   Temp: 97 8 °F (36 6 °C)        PHYSICAL EXAM  General Appearance:    Alert, cooperative, no distress,    Head:    Normocephalic without obvious abnormality   Eyes:    PERRL, conjunctiva/corneas clear     Neck:   Supple, no adenopathy, no JVD   Back:     Symmetric, no spinal or CVA tenderness   Lungs:     Clear to auscultation bilaterally, no wheezing or rhonchi   Heart:    Regular rate and rhythm, S1 and S2 normal, no murmur   Abdomen:     Benign, no rebound or guarding  Extremities:   Extremities normal  No clubbing, cyanosis or edema   Psych:   Normal Affect, AOx3  Neurologic:  Skin:   CNII-XII intact  Strength symmetric, speech intact    Warm, dry, intact, no visible rashes or lesions except as follows:      medial left breast near the sternum, area of hidradenitis  Some portions of this record may have been generated with voice recognition software  There may be translation, syntax,  or grammatical errors  Occasional wrong word or "sound-a-like" substitutions may have occurred due to the inherent limitations of the voice recognition software  Read the chart carefully and recognize, using context, where substitutions may have occurred  If you have any questions, please contact the dictating provider for clarification or correction, as needed   This encounter has been coded by a non-certified coder         Quita Crigler, MD    Date: 8/9/2021 Time: 11:40 AM

## 2021-08-30 ENCOUNTER — HOSPITAL ENCOUNTER (EMERGENCY)
Facility: HOSPITAL | Age: 26
Discharge: HOME | End: 2021-08-30
Attending: EMERGENCY MEDICINE
Payer: COMMERCIAL

## 2021-08-30 VITALS
WEIGHT: 275.57 LBS | HEART RATE: 82 BPM | OXYGEN SATURATION: 100 % | BODY MASS INDEX: 47.67 KG/M2 | RESPIRATION RATE: 16 BRPM | DIASTOLIC BLOOD PRESSURE: 86 MMHG | TEMPERATURE: 98.6 F | SYSTOLIC BLOOD PRESSURE: 155 MMHG

## 2021-08-30 DIAGNOSIS — S61.212A LACERATION OF RIGHT MIDDLE FINGER WITHOUT FOREIGN BODY WITHOUT DAMAGE TO NAIL, INITIAL ENCOUNTER: Primary | ICD-10-CM

## 2021-08-30 PROCEDURE — 12002 RPR S/N/AX/GEN/TRNK2.6-7.5CM: CPT | Performed by: EMERGENCY MEDICINE

## 2021-08-30 PROCEDURE — 99282 EMERGENCY DEPT VISIT SF MDM: CPT | Performed by: EMERGENCY MEDICINE

## 2021-08-30 PROCEDURE — 99282 EMERGENCY DEPT VISIT SF MDM: CPT

## 2021-08-30 RX ORDER — LIDOCAINE HYDROCHLORIDE 10 MG/ML
10 INJECTION, SOLUTION EPIDURAL; INFILTRATION; INTRACAUDAL; PERINEURAL ONCE
Status: COMPLETED | OUTPATIENT
Start: 2021-08-30 | End: 2021-08-30

## 2021-08-30 RX ADMIN — LIDOCAINE HYDROCHLORIDE 10 ML: 10 INJECTION, SOLUTION EPIDURAL; INFILTRATION; INTRACAUDAL; PERINEURAL at 20:04

## 2021-08-30 NOTE — ED PROVIDER NOTES
History  Chief Complaint   Patient presents with    Finger Laceration     Laceration to distal right 3rd digit with knife while cooking  Last tetanus vaccine believed to be last year  Bleeding controlled  29-year-old female with history of asthma, diabetes, PCOS who presents for evaluation of a finger laceration  Patient was cutting food while cooking when she accidentally caught the distal portion of her right 3rd digit  This happened just prior to arrival   She reports there initially was a lot of bleeding from the wound which has since been controlled with pressure  She states that she has had a tetanus booster within the past year  Prior to Admission Medications   Prescriptions Last Dose Informant Patient Reported? Taking?   metFORMIN (GLUCOPHAGE) 850 mg tablet  Self No No   Sig: Take 1 tablet (850 mg total) by mouth 2 (two) times a day with meals   norethindrone (MICRONOR) 0 35 MG tablet   No No   Sig: TAKE 1 TABLET(0 35 MG) BY MOUTH DAILY      Facility-Administered Medications: None       Past Medical History:   Diagnosis Date    Asthma     Diabetes mellitus ([facility] [address] )     Polycystic ovarian disease        History reviewed  No pertinent surgical history  Family History   Problem Relation Age of Onset    Hypertension Mother     Diabetes Mother     Asthma Mother     Arthritis Mother     Alcohol abuse Father     No Known Problems Sister     No Known Problems Brother     No Known Problems Brother     No Known Problems Brother     No Known Problems Brother     No Known Problems Brother     No Known Problems Brother      I have reviewed and agree with the history as documented      E-Cigarette/Vaping    E-Cigarette Use Never User      E-Cigarette/Vaping Substances    Nicotine No     THC No     CBD No     Flavoring No     Other No     Unknown No      Social History     Tobacco Use    Smoking status: Never Smoker    Smokeless tobacco: Never Used   Vaping Use    Vaping Use: Never used   Substance Use Topics    Alcohol use: Not Currently     Alcohol/week: 6 0 standard drinks     Types: 6 Cans of beer per week     Comment: social    Drug use: Never        Review of Systems   Respiratory: Negative for shortness of breath  Cardiovascular: Negative for chest pain  Gastrointestinal: Negative for abdominal pain  Skin: Positive for wound  Negative for pallor  Neurological: Negative for weakness and numbness  All other systems reviewed and are negative  Physical Exam  ED Triage Vitals [08/30/21 1939]   Temperature Pulse Respirations Blood Pressure SpO2   98 7 °F (37 1 °C) 80 16 163/81 100 %      Temp Source Heart Rate Source Patient Position - Orthostatic VS BP Location FiO2 (%)   Oral Monitor Sitting Left arm --      Pain Score       Worst Possible Pain             Orthostatic Vital Signs  Vitals:    08/30/21 1939 08/30/21 2033   BP: 163/81 155/86   Pulse: 80 82   Patient Position - Orthostatic VS: Sitting        Physical Exam  Vitals and nursing note reviewed  Constitutional:       General: She is not in acute distress  Appearance: She is well-developed  HENT:      Head: Normocephalic and atraumatic  Eyes:      Conjunctiva/sclera: Conjunctivae normal       Pupils: Pupils are equal, round, and reactive to light  Cardiovascular:      Rate and Rhythm: Normal rate and regular rhythm  Heart sounds: No murmur heard  No friction rub  No gallop  Comments: 2+ radial pulses bilaterally  Pulmonary:      Effort: Pulmonary effort is normal       Breath sounds: Normal breath sounds  No wheezing or rales  Abdominal:      General: There is no distension  Tenderness: There is no abdominal tenderness  Musculoskeletal:         General: Normal range of motion  Cervical back: Normal range of motion and neck supple  Comments: Range of motion of the right 3rd digit intact  Skin:     General: Skin is warm and dry  Coloration: Skin is not pale  Findings: No rash  Comments: 4 centimeter laceration to the palmar aspect of the right 3rd digit  There is no obvious tendon involvement  Bleeding is controlled  Neurological:      General: No focal deficit present  Mental Status: She is alert and oriented to person, place, and time  Comments: Sensation intact to the right hand  Psychiatric:         Behavior: Behavior normal          ED Medications  Medications   lidocaine (PF) (XYLOCAINE-MPF) 1 % injection 10 mL (10 mL Infiltration Given by Other 8/30/21 2004)       Diagnostic Studies  Results Reviewed     None                 No orders to display         Procedures  Laceration repair    Date/Time: 8/30/2021 8:00 PM  Performed by: Dwaine Figueroa MD  Authorized by: Dwaine Figueroa MD   Consent: Verbal consent obtained    Risks and benefits: risks, benefits and alternatives were discussed  Consent given by: patient  Patient identity confirmed: verbally with patient  Body area: upper extremity  Location details: right index finger  Laceration length: 4 cm  Foreign bodies: no foreign bodies  Tendon involvement: none  Nerve involvement: none  Vascular damage: no  Anesthesia: local infiltration    Anesthesia:  Local Anesthetic: lidocaine 1% without epinephrine  Anesthetic total: 2 mL    Wound Dehiscence:  Superficial Wound Dehiscence: simple closure      Procedure Details:  Irrigation solution: saline  Irrigation method: syringe  Amount of cleaning: standard  Skin closure: Ethilon  Number of sutures: 4  Technique: simple  Approximation: close  Approximation difficulty: simple  Dressing: 4x4 sterile gauze and splint  Patient tolerance: patient tolerated the procedure well with no immediate complications    Splint application    Date/Time: 8/30/2021 8:20 PM  Performed by: Dwaine Figueroa MD  Authorized by: Dwaine Figueroa MD   Universal Protocol:  Procedure performed by: (nursing)  Risks and benefits: risks, benefits and alternatives were discussed  Consent given by: patient      Pre-procedure details:     Sensation:  Normal  Procedure details:     Laterality:  Right    Location:  Finger    Finger:  R index finger    Splint type:  Finger splint, static  Post-procedure details:     Pain:  Unchanged    Sensation:  Normal    Patient tolerance of procedure: Tolerated well, no immediate complications          ED Course                             SBIRT 20yo+      Most Recent Value   SBIRT (22 yo +)   In order to provide better care to our patients, we are screening all of our patients for alcohol and drug use  Would it be okay to ask you these screening questions? Unable to answer at this time Filed at: 08/30/2021 1943                [facility] [place]  Number of Diagnoses or Management Options  Laceration of right middle finger without foreign body without damage to nail, initial encounter: new and does not require workup  Diagnosis management comments: 18-year-old female who presents for evaluation of laceration to the right 3rd digit  Patient is up-to-date on tetanus  The extremity is neurovascularly intact  Laceration repaired  Digit placed in static finger splint  Advised follow-up for suture removal in 7-10 days  Return precautions discussed         Amount and/or Complexity of Data Reviewed  Review and summarize past medical records: yes    Risk of Complications, Morbidity, and/or Mortality  Presenting problems: moderate  Diagnostic procedures: low  Management options: low    Patient Progress  Patient progress: improved      Disposition  Final diagnoses:   Laceration of right middle finger without foreign body without damage to nail, initial encounter     Time reflects when diagnosis was documented in both MDM as applicable and the Disposition within this note     Time User Action Codes Description Comment    8/30/2021  8:30 PM Tamiko Cordoba Add [E01 587P] Laceration of right middle finger without foreign body without damage to nail, initial encounter ED Disposition     ED Disposition Condition Date/Time Comment    Discharge Stable Mon Aug 30, 2021  8:30 PM Gregorio Croft discharge to home/self care  Follow-up Information     Follow up With Specialties Details Why Contact Info    Princess Azul MD Family Medicine In 1 week For suture removal [address]  [facility]  [phone]            Discharge Medication List as of 8/30/2021  8:31 PM      CONTINUE these medications which have NOT CHANGED    Details   metFORMIN (GLUCOPHAGE) 850 mg tablet Take 1 tablet (850 mg total) by mouth 2 (two) times a day with meals, Starting Mon 4/5/2021, Normal      norethindrone (MICRONOR) 0 35 MG tablet TAKE 1 TABLET(0 35 MG) BY MOUTH DAILY, Normal           No discharge procedures on file  PDMP Review     None           ED Provider  Attending physically available and evaluated Gregorio Croft I managed the patient along with the ED Attending      Electronically Signed by         Jadine Crigler, MD  08/30/21 9643

## 2021-08-31 NOTE — ED ATTENDING ATTESTATION
8/30/2021  IGisselle MD, saw and evaluated the patient  I have discussed the patient with the resident/non-physician practitioner and agree with the resident's/non-physician practitioner's findings, Plan of Care, and MDM as documented in the resident's/non-physician practitioner's note, except where noted  All available labs and Radiology studies were reviewed  I was present for key portions of any procedure(s) performed by the resident/non-physician practitioner and I was immediately available to provide assistance  At this point I agree with the current assessment done in the Emergency Department  I have conducted an independent evaluation of this patient a history and physical is as follows:    30 YO female sustained a laceration to the palmar aspect of the Right 3rd digit from a knife, no weakness, tetanus up to date  Finger has a laceration over the Right third digit, palmar aspect, this is overlying the PIP, some fat extrusion, normal tendon exam      - Resident will suture, have splint placed as dehiscence is likely due to being over a joint      ED Course         Critical Care Time  Procedures

## 2021-08-31 NOTE — DISCHARGE INSTRUCTIONS
Follow up with either your primary care physician, an urgent care, or return to the emergency department in 7-10 days for removal of your sutures  You should keep the splint in place at all times until healed to prevent the sutures from coming out  Please return to the emergency department if you develop severe pain, numbness, color change, drainage from the wound, fever, or anything else concerning to you

## 2021-09-02 ENCOUNTER — OFFICE VISIT (OUTPATIENT)
Dept: FAMILY MEDICINE CLINIC | Facility: CLINIC | Age: 26
End: 2021-09-02
Payer: COMMERCIAL

## 2021-09-02 VITALS
TEMPERATURE: 96.5 F | RESPIRATION RATE: 16 BRPM | DIASTOLIC BLOOD PRESSURE: 86 MMHG | SYSTOLIC BLOOD PRESSURE: 106 MMHG | WEIGHT: 276 LBS | HEART RATE: 97 BPM | BODY MASS INDEX: 48.9 KG/M2 | HEIGHT: 63 IN | OXYGEN SATURATION: 98 %

## 2021-09-02 DIAGNOSIS — E66.01 MORBID OBESITY (HCC): ICD-10-CM

## 2021-09-02 DIAGNOSIS — R94.31 ABNORMAL EKG: ICD-10-CM

## 2021-09-02 DIAGNOSIS — Z71.85 IMMUNIZATION COUNSELING: ICD-10-CM

## 2021-09-02 DIAGNOSIS — N63.20 LEFT BREAST LUMP: Primary | ICD-10-CM

## 2021-09-02 DIAGNOSIS — R77.9 ELEVATED BLOOD PROTEIN: ICD-10-CM

## 2021-09-02 DIAGNOSIS — I10 HYPERTENSION, UNSPECIFIED TYPE: ICD-10-CM

## 2021-09-02 DIAGNOSIS — I49.8 SINUS ARRHYTHMIA: ICD-10-CM

## 2021-09-02 DIAGNOSIS — D50.9 MICROCYTIC ANEMIA: ICD-10-CM

## 2021-09-02 PROCEDURE — 3074F SYST BP LT 130 MM HG: CPT | Performed by: FAMILY MEDICINE

## 2021-09-02 PROCEDURE — 99214 OFFICE O/P EST MOD 30 MIN: CPT | Performed by: FAMILY MEDICINE

## 2021-09-02 PROCEDURE — 3079F DIAST BP 80-89 MM HG: CPT | Performed by: FAMILY MEDICINE

## 2021-09-02 PROCEDURE — 1036F TOBACCO NON-USER: CPT | Performed by: FAMILY MEDICINE

## 2021-09-02 PROCEDURE — 3008F BODY MASS INDEX DOCD: CPT | Performed by: FAMILY MEDICINE

## 2021-09-02 NOTE — PROGRESS NOTES
Assessment/Plan:       No problem-specific Assessment & Plan notes found for this encounter  Diagnoses and all orders for this visit:    Left breast lump  Comments:  Surgical consult noted    Hypertension, unspecified type  Comments:  Most likely patient has elevated blood pressure secondary to her weight  Advised to lose weight   Check UA  Order exit  Orders:  -     Echo complete with contrast if indicated; Future    Microcytic anemia  Comments:  check stool for blood  Order exist    Morbid obesity ([address] )  Comments:  following with bariatric  Bariatric office visit on7-28 noted    Elevated blood protein  Comments:  check SPEP , order exist     Sinus arrhythmia    Abnormal EKG  -     Echo complete with contrast if indicated; Future    Immunization counseling  Comments:  pt declined COVID vaccine, flu shot        Patient Instructions   To follow up with test results      Orders Placed This Encounter   Procedures    Echo complete with contrast if indicated     Standing Status:   Future     Standing Expiration Date:   9/2/2025         Subjective:     Patient ID: Silvino Miller is a 31 y o  female      HPI  Left breast lump  Sawbreast surgeon  Scheduled for surgery on September 30/21  Hypertension, admit to regular salt and caffeine intake  Denied headache flushing or dizziness  Microscopic anemia  Denied melena or rectal bleeding or other bleeding  Obesity  She did follow with Pediatric  Recent EKG is abnormal   Denied chest pain or shortness of breath    Was seen at the ER a for right middle finger laceration, she started with a knife while cooking  Doing well, old is healing well  No drainage or erythema  Denied weakness or numbness of the finger  he is going to follow with the ER in couple days  Had Tdap last year in [place]      Test results  Lab done on July 24, 2021  EKG  Discussed result with patient    Review of Systems   Constitutional: Negative for activity change, appetite change, chills, fatigue, fever and unexpected weight change  HENT: Negative for congestion, ear discharge, ear pain, hearing loss, nosebleeds, rhinorrhea, sinus pressure, sore throat, tinnitus, trouble swallowing and voice change  Eyes: Negative for photophobia, pain and visual disturbance  Respiratory: Negative for cough, chest tightness, shortness of breath and wheezing  Cardiovascular: Negative for chest pain, palpitations and leg swelling  Gastrointestinal: Negative for abdominal pain, anal bleeding, blood in stool, constipation, diarrhea, nausea and vomiting  Endocrine: Negative for cold intolerance, heat intolerance, polydipsia and polyuria  Genitourinary: Negative for dysuria, frequency, hematuria and urgency  Musculoskeletal: Negative for arthralgias, back pain, gait problem, joint swelling, myalgias and neck pain  Skin: Positive for wound  Negative for rash  Neurological: Negative for dizziness, tremors, seizures, syncope, weakness, light-headedness and headaches  Hematological: Negative for adenopathy  Does not bruise/bleed easily  Psychiatric/Behavioral: Negative for agitation, behavioral problems, confusion, dysphoric mood, hallucinations and sleep disturbance  The patient is not nervous/anxious  Objective:     Physical Exam  Nursing note reviewed  Constitutional:       General: She is not in acute distress  Appearance: Normal appearance  She is well-developed  HENT:      Head: Normocephalic  Eyes:      General: No scleral icterus  Extraocular Movements: Extraocular movements intact  Conjunctiva/sclera: Conjunctivae normal    Neck:      Thyroid: No thyromegaly  Vascular: No carotid bruit  Cardiovascular:      Rate and Rhythm: Normal rate and regular rhythm  Heart sounds: Normal heart sounds  Pulmonary:      Effort: Pulmonary effort is normal       Breath sounds: Normal breath sounds     Abdominal:      General: Bowel sounds are normal  There is no distension  Palpations: Abdomen is soft  There is no mass  Tenderness: There is no abdominal tenderness  There is no guarding or rebound  Hernia: No hernia is present  Musculoskeletal:         General: No swelling  Cervical back: Neck supple  Right lower leg: No edema  Left lower leg: No edema  Comments: Right finger is wrapped well with goes and she had metal splint on  Lymphadenopathy:      Cervical: No cervical adenopathy  Skin:     Findings: No rash  Neurological:      General: No focal deficit present  Mental Status: She is alert and oriented to person, place, and time  Cranial Nerves: No cranial nerve deficit  Motor: No abnormal muscle tone  Coordination: Coordination normal       Gait: Gait normal    Psychiatric:         Mood and Affect: Mood normal          Behavior: Behavior normal          Thought Content:  Thought content normal

## 2021-09-03 PROBLEM — R94.31 ABNORMAL EKG: Status: ACTIVE | Noted: 2021-09-03

## 2021-09-03 PROBLEM — I49.8 SINUS ARRHYTHMIA: Status: ACTIVE | Noted: 2021-09-03

## 2021-09-03 PROBLEM — I10 HYPERTENSION: Status: ACTIVE | Noted: 2021-09-03

## 2021-09-03 PROBLEM — R77.9 ELEVATED BLOOD PROTEIN: Status: ACTIVE | Noted: 2021-09-03

## 2021-09-03 PROBLEM — Z71.85 IMMUNIZATION COUNSELING: Status: ACTIVE | Noted: 2021-09-03

## 2021-09-22 ENCOUNTER — TELEPHONE (OUTPATIENT)
Dept: FAMILY MEDICINE CLINIC | Facility: CLINIC | Age: 26
End: 2021-09-22

## 2021-09-22 ENCOUNTER — OFFICE VISIT (OUTPATIENT)
Dept: SURGERY | Facility: CLINIC | Age: 26
End: 2021-09-22

## 2021-09-22 ENCOUNTER — PREP FOR PROCEDURE (OUTPATIENT)
Dept: SURGERY | Facility: CLINIC | Age: 26
End: 2021-09-22

## 2021-09-22 ENCOUNTER — CLINICAL SUPPORT (OUTPATIENT)
Dept: FAMILY MEDICINE CLINIC | Facility: CLINIC | Age: 26
End: 2021-09-22

## 2021-09-22 VITALS
HEART RATE: 99 BPM | HEIGHT: 63 IN | DIASTOLIC BLOOD PRESSURE: 86 MMHG | SYSTOLIC BLOOD PRESSURE: 118 MMHG | WEIGHT: 276 LBS | BODY MASS INDEX: 48.9 KG/M2

## 2021-09-22 DIAGNOSIS — Z20.822 EXPOSURE TO COVID-19 VIRUS: Primary | ICD-10-CM

## 2021-09-22 DIAGNOSIS — L73.2 HIDRADENITIS SUPPURATIVA: Primary | ICD-10-CM

## 2021-09-22 PROCEDURE — PREOP: Performed by: PHYSICIAN ASSISTANT

## 2021-09-22 PROCEDURE — U0005 INFEC AGEN DETEC AMPLI PROBE: HCPCS | Performed by: FAMILY MEDICINE

## 2021-09-22 PROCEDURE — U0003 INFECTIOUS AGENT DETECTION BY NUCLEIC ACID (DNA OR RNA); SEVERE ACUTE RESPIRATORY SYNDROME CORONAVIRUS 2 (SARS-COV-2) (CORONAVIRUS DISEASE [COVID-19]), AMPLIFIED PROBE TECHNIQUE, MAKING USE OF HIGH THROUGHPUT TECHNOLOGIES AS DESCRIBED BY CMS-2020-01-R: HCPCS | Performed by: FAMILY MEDICINE

## 2021-09-22 NOTE — PROGRESS NOTES
Assessment/Plan:   Aimee Powell is a 31 y  o female who is here for The encounter diagnosis was Hidradenitis suppurativa  On exam found to have Sebaceous Cyst/ hydradenitis of the : left breast          Plan: Excise lesion(s) under anesthesia in the operating room     patient is aware of increased risk of recurrence due to obesity  Patient has talked to weight management and considering weight loss surgery    Positioning: supine    Post Op Pain Management:   Norco    - Patient has been instructed to avoid herbs or non-directed vitamins the week prior to surgery to ensure no drug interactions with perioperative surgical and anesthetic medications  - Hold metformin , If on this medication, the morning of surgery and do not resume until 48 hours AFTER surgery to avoid risk of lactic acidosis  Do not resume if eGFR is < 30  - Patient has been instructed to avoid aspirin containing medications or non-steroidal anti-inflammatory drugs for SEVEN days preceding surgery  Preoperative Clearance: None      Physical Exam  Chest:             _______________________________________________________  CC: Follow-up    HPI:  Aimee Powell is a 31 y  o female who was referred for evaluation of Follow-up    Currently patient reports  Recurrent painful lesions of left breast   Consistent with hidradenitis    Reports: growing, painful and draining    Location:  Left breast      ROS:  General ROS: negative  negative for - chills, fatigue, fever or night sweats, weight loss  Respiratory ROS: no cough, shortness of breath, or wheezing  Cardiovascular ROS: no chest pain or dyspnea on exertion  Genito-Urinary ROS: no dysuria, trouble voiding, or hematuria  Musculoskeletal ROS: negative for - gait disturbance, joint pain or muscle pain  Neurological ROS: no TIA or stroke symptoms  Skin ROS: See HPI  GI ROS: see HPI  Skin ROS: no new rashes or lesions   Lymphatic ROS: no new adenopathy noted by pt     GYN ROS: see HPI, no new GYN history or bleeding noted  Psy ROS: no new mental or behavioral disturbances       Patient Active Problem List   Diagnosis    Left breast lump    Microcytic anemia    Irregular menstrual cycle    Hidradenitis suppurativa    Morbid obesity ([facility] )    IFG (impaired fasting glucose)    Elevated blood protein    Sinus arrhythmia    Abnormal EKG    Immunization counseling    Hypertension         Allergies:  Patient has no known allergies  Current Outpatient Medications:     metFORMIN (GLUCOPHAGE) 850 mg tablet, Take 1 tablet (850 mg total) by mouth 2 (two) times a day with meals, Disp: 60 tablet, Rfl: 6    norethindrone (MICRONOR) 0 35 MG tablet, TAKE 1 TABLET(0 35 MG) BY MOUTH DAILY, Disp: 28 tablet, Rfl: 2    Past Medical History:   Diagnosis Date    Asthma     Diabetes mellitus ([facility] )     Polycystic ovarian disease        History reviewed  No pertinent surgical history  Family History   Problem Relation Age of Onset    Hypertension Mother     Diabetes Mother     Asthma Mother     Arthritis Mother     Alcohol abuse Father     No Known Problems Sister     No Known Problems Brother     No Known Problems Brother     No Known Problems Brother     No Known Problems Brother     No Known Problems Brother     No Known Problems Brother         reports that she has never smoked  She has never used smokeless tobacco  She reports previous alcohol use of about 6 0 standard drinks of alcohol per week  She reports that she does not use drugs      Vitals:    09/22/21 0939   BP: 118/86   Pulse: 99        PHYSICAL EXAM  General Appearance:    Alert, cooperative, no distress, healthy   Head:    Normocephalic without obvious abnormality   Eyes:    PERRL, conjunctiva/corneas clear     Neck:   Supple, no adenopathy, no JVD   Back:     Symmetric, no spinal or CVA tenderness   Lungs:     Clear to auscultation bilaterally, no wheezing or rhonchi   Heart:    Regular rate and rhythm, S1 and S2 normal, no

## 2021-09-22 NOTE — TELEPHONE ENCOUNTER
Patient called states that her room mate tested positive for covid she is not vaccinated and she has no symptoms but she did need to inform work and now they are asking for a covid test

## 2021-09-23 LAB — SARS-COV-2 RNA RESP QL NAA+PROBE: NEGATIVE

## 2021-09-23 RX ORDER — CEFAZOLIN SODIUM 2 G/50ML
2000 SOLUTION INTRAVENOUS ONCE
Status: CANCELLED | OUTPATIENT
Start: 2021-09-30 | End: 2021-09-23

## 2021-09-24 ENCOUNTER — ANESTHESIA EVENT (OUTPATIENT)
Dept: PERIOP | Facility: HOSPITAL | Age: 26
End: 2021-09-24
Payer: COMMERCIAL

## 2021-09-30 ENCOUNTER — ANESTHESIA (OUTPATIENT)
Dept: PERIOP | Facility: HOSPITAL | Age: 26
End: 2021-09-30
Payer: COMMERCIAL

## 2021-09-30 ENCOUNTER — TELEPHONE (OUTPATIENT)
Dept: FAMILY MEDICINE CLINIC | Facility: CLINIC | Age: 26
End: 2021-09-30

## 2021-09-30 ENCOUNTER — HOSPITAL ENCOUNTER (OUTPATIENT)
Facility: HOSPITAL | Age: 26
Setting detail: OUTPATIENT SURGERY
Discharge: HOME | End: 2021-09-30
Attending: SURGERY | Admitting: SURGERY
Payer: COMMERCIAL

## 2021-09-30 ENCOUNTER — OFFICE VISIT (OUTPATIENT)
Dept: FAMILY MEDICINE CLINIC | Facility: CLINIC | Age: 26
End: 2021-09-30
Payer: COMMERCIAL

## 2021-09-30 VITALS
HEART RATE: 78 BPM | RESPIRATION RATE: 16 BRPM | HEIGHT: 63 IN | SYSTOLIC BLOOD PRESSURE: 122 MMHG | DIASTOLIC BLOOD PRESSURE: 76 MMHG | WEIGHT: 286 LBS | OXYGEN SATURATION: 98 % | TEMPERATURE: 96.8 F | BODY MASS INDEX: 50.68 KG/M2

## 2021-09-30 VITALS
SYSTOLIC BLOOD PRESSURE: 137 MMHG | WEIGHT: 276 LBS | RESPIRATION RATE: 16 BRPM | HEART RATE: 65 BPM | TEMPERATURE: 97 F | OXYGEN SATURATION: 98 % | HEIGHT: 63 IN | BODY MASS INDEX: 48.9 KG/M2 | DIASTOLIC BLOOD PRESSURE: 79 MMHG

## 2021-09-30 DIAGNOSIS — R73.03 PREDIABETES: ICD-10-CM

## 2021-09-30 DIAGNOSIS — L73.2 HIDRADENITIS SUPPURATIVA: ICD-10-CM

## 2021-09-30 DIAGNOSIS — Z71.85 IMMUNIZATION COUNSELING: ICD-10-CM

## 2021-09-30 DIAGNOSIS — N63.20 LEFT BREAST LUMP: Primary | ICD-10-CM

## 2021-09-30 DIAGNOSIS — R03.0 BLOOD PRESSURE ELEVATED WITHOUT HISTORY OF HTN: Primary | ICD-10-CM

## 2021-09-30 PROBLEM — J45.909 ASTHMA: Status: ACTIVE | Noted: 2021-09-30

## 2021-09-30 PROBLEM — E11.9 DIABETES MELLITUS, TYPE 2 (HCC): Status: ACTIVE | Noted: 2021-09-30

## 2021-09-30 LAB
GLUCOSE SERPL-MCNC: 102 MG/DL (ref 65–140)
GLUCOSE SERPL-MCNC: 122 MG/DL (ref 65–140)
HCG SERPL QL: NEGATIVE

## 2021-09-30 PROCEDURE — 99213 OFFICE O/P EST LOW 20 MIN: CPT | Performed by: FAMILY MEDICINE

## 2021-09-30 PROCEDURE — 82948 REAGENT STRIP/BLOOD GLUCOSE: CPT

## 2021-09-30 PROCEDURE — 88304 TISSUE EXAM BY PATHOLOGIST: CPT | Performed by: PATHOLOGY

## 2021-09-30 PROCEDURE — 84703 CHORIONIC GONADOTROPIN ASSAY: CPT | Performed by: ANESTHESIOLOGY

## 2021-09-30 PROCEDURE — 3008F BODY MASS INDEX DOCD: CPT | Performed by: FAMILY MEDICINE

## 2021-09-30 PROCEDURE — 11450 EXC SKN HDRDNT AX SMPL/NTRM: CPT | Performed by: SURGERY

## 2021-09-30 RX ORDER — SODIUM CHLORIDE 9 MG/ML
125 INJECTION, SOLUTION INTRAVENOUS CONTINUOUS
Status: DISCONTINUED | OUTPATIENT
Start: 2021-09-30 | End: 2021-09-30 | Stop reason: HOSPADM

## 2021-09-30 RX ORDER — DEXAMETHASONE SODIUM PHOSPHATE 4 MG/ML
INJECTION, SOLUTION INTRA-ARTICULAR; INTRALESIONAL; INTRAMUSCULAR; INTRAVENOUS; SOFT TISSUE AS NEEDED
Status: DISCONTINUED | OUTPATIENT
Start: 2021-09-30 | End: 2021-09-30

## 2021-09-30 RX ORDER — FENTANYL CITRATE/PF 50 MCG/ML
50 SYRINGE (ML) INJECTION
Status: DISCONTINUED | OUTPATIENT
Start: 2021-09-30 | End: 2021-09-30 | Stop reason: HOSPADM

## 2021-09-30 RX ORDER — CEFAZOLIN SODIUM 2 G/50ML
2000 SOLUTION INTRAVENOUS ONCE
Status: COMPLETED | OUTPATIENT
Start: 2021-09-30 | End: 2021-09-30

## 2021-09-30 RX ORDER — MIDAZOLAM HYDROCHLORIDE 2 MG/2ML
INJECTION, SOLUTION INTRAMUSCULAR; INTRAVENOUS AS NEEDED
Status: DISCONTINUED | OUTPATIENT
Start: 2021-09-30 | End: 2021-09-30

## 2021-09-30 RX ORDER — OXYCODONE HYDROCHLORIDE 5 MG/1
5 TABLET ORAL EVERY 4 HOURS PRN
Status: DISCONTINUED | OUTPATIENT
Start: 2021-09-30 | End: 2021-09-30 | Stop reason: HOSPADM

## 2021-09-30 RX ORDER — KETOROLAC TROMETHAMINE 30 MG/ML
INJECTION, SOLUTION INTRAMUSCULAR; INTRAVENOUS AS NEEDED
Status: DISCONTINUED | OUTPATIENT
Start: 2021-09-30 | End: 2021-09-30

## 2021-09-30 RX ORDER — EPHEDRINE SULFATE 50 MG/ML
INJECTION INTRAVENOUS AS NEEDED
Status: DISCONTINUED | OUTPATIENT
Start: 2021-09-30 | End: 2021-09-30

## 2021-09-30 RX ORDER — ONDANSETRON 2 MG/ML
4 INJECTION INTRAMUSCULAR; INTRAVENOUS ONCE AS NEEDED
Status: DISCONTINUED | OUTPATIENT
Start: 2021-09-30 | End: 2021-09-30 | Stop reason: HOSPADM

## 2021-09-30 RX ORDER — HYDROMORPHONE HCL/PF 1 MG/ML
0.5 SYRINGE (ML) INJECTION
Status: DISCONTINUED | OUTPATIENT
Start: 2021-09-30 | End: 2021-09-30 | Stop reason: HOSPADM

## 2021-09-30 RX ORDER — FENTANYL CITRATE 50 UG/ML
INJECTION, SOLUTION INTRAMUSCULAR; INTRAVENOUS AS NEEDED
Status: DISCONTINUED | OUTPATIENT
Start: 2021-09-30 | End: 2021-09-30

## 2021-09-30 RX ORDER — HYDROCODONE BITARTRATE AND ACETAMINOPHEN 5; 325 MG/1; MG/1
1 TABLET ORAL EVERY 6 HOURS PRN
Qty: 5 TABLET | Refills: 0 | Status: SHIPPED | OUTPATIENT
Start: 2021-09-30

## 2021-09-30 RX ORDER — ONDANSETRON 2 MG/ML
INJECTION INTRAMUSCULAR; INTRAVENOUS AS NEEDED
Status: DISCONTINUED | OUTPATIENT
Start: 2021-09-30 | End: 2021-09-30

## 2021-09-30 RX ADMIN — KETOROLAC TROMETHAMINE 30 MG: 30 INJECTION, SOLUTION INTRAMUSCULAR at 10:01

## 2021-09-30 RX ADMIN — EPHEDRINE SULFATE 10 MG: 50 INJECTION, SOLUTION INTRAVENOUS at 10:14

## 2021-09-30 RX ADMIN — FENTANYL CITRATE 25 MCG: 50 INJECTION INTRAMUSCULAR; INTRAVENOUS at 09:45

## 2021-09-30 RX ADMIN — SODIUM CHLORIDE: 0.9 INJECTION, SOLUTION INTRAVENOUS at 10:29

## 2021-09-30 RX ADMIN — FENTANYL CITRATE 25 MCG: 50 INJECTION INTRAMUSCULAR; INTRAVENOUS at 10:05

## 2021-09-30 RX ADMIN — FENTANYL CITRATE 25 MCG: 50 INJECTION INTRAMUSCULAR; INTRAVENOUS at 09:52

## 2021-09-30 RX ADMIN — FENTANYL CITRATE 50 MCG: 50 INJECTION INTRAMUSCULAR; INTRAVENOUS at 09:37

## 2021-09-30 RX ADMIN — FENTANYL CITRATE 25 MCG: 50 INJECTION INTRAMUSCULAR; INTRAVENOUS at 09:56

## 2021-09-30 RX ADMIN — SODIUM CHLORIDE 125 ML/HR: 0.9 INJECTION, SOLUTION INTRAVENOUS at 08:51

## 2021-09-30 RX ADMIN — ONDANSETRON 4 MG: 2 INJECTION INTRAMUSCULAR; INTRAVENOUS at 09:48

## 2021-09-30 RX ADMIN — CEFAZOLIN SODIUM 2000 MG: 2 SOLUTION INTRAVENOUS at 09:37

## 2021-09-30 RX ADMIN — FENTANYL CITRATE 25 MCG: 50 INJECTION INTRAMUSCULAR; INTRAVENOUS at 09:48

## 2021-09-30 RX ADMIN — MIDAZOLAM 2 MG: 1 INJECTION INTRAMUSCULAR; INTRAVENOUS at 09:37

## 2021-09-30 RX ADMIN — DEXAMETHASONE SODIUM PHOSPHATE 8 MG: 4 INJECTION INTRA-ARTICULAR; INTRALESIONAL; INTRAMUSCULAR; INTRAVENOUS; SOFT TISSUE at 09:48

## 2021-09-30 RX ADMIN — FENTANYL CITRATE 25 MCG: 50 INJECTION INTRAMUSCULAR; INTRAVENOUS at 10:01

## 2021-09-30 NOTE — ANESTHESIA PREPROCEDURE EVALUATION
Procedure:  EXCISION WIDE LESION BREAST (Left Breast)    Relevant Problems   ANESTHESIA (within normal limits)      CARDIO  h/o sinus arrythmia    (+) Hypertension   (+) Sinus arrhythmia      ENDO  BMI 49    (+) Diabetes mellitus, type 2 (HCC)      GI/HEPATIC (within normal limits)      GYN  PCOS       HEMATOLOGY   (+) Microcytic anemia      MUSCULOSKELETAL (within normal limits)      NEURO/PSYCH (within normal limits)      PULMONARY   (+) Asthma        Physical Exam    Airway    Mallampati score: II  TM Distance: >3 FB  Neck ROM: full     Dental       Cardiovascular  Rhythm: regular, Rate: normal, Cardiovascular exam normal    Pulmonary  Pulmonary exam normal Breath sounds clear to auscultation,     Other Findings        Anesthesia Plan  ASA Score- 3     Anesthesia Type- general with ASA Monitors  Additional Monitors:   Airway Plan: ETT and LMA  Plan Factors-    Chart reviewed  EKG reviewed  Existing labs reviewed  Patient summary reviewed  Patient is not a current smoker  Induction- intravenous  Postoperative Plan-     Informed Consent- Anesthetic plan and risks discussed with patient

## 2021-10-01 ENCOUNTER — TELEPHONE (OUTPATIENT)
Dept: SURGERY | Facility: CLINIC | Age: 26
End: 2021-10-01

## 2021-10-02 PROBLEM — R73.03 PREDIABETES: Status: ACTIVE | Noted: 2021-10-02

## 2021-10-04 ENCOUNTER — OFFICE VISIT (OUTPATIENT)
Dept: SURGERY | Facility: CLINIC | Age: 26
End: 2021-10-04

## 2021-10-04 VITALS
TEMPERATURE: 97.8 F | HEART RATE: 82 BPM | HEIGHT: 63 IN | SYSTOLIC BLOOD PRESSURE: 124 MMHG | WEIGHT: 280 LBS | BODY MASS INDEX: 49.61 KG/M2 | DIASTOLIC BLOOD PRESSURE: 78 MMHG

## 2021-10-04 DIAGNOSIS — L73.2 HIDRADENITIS SUPPURATIVA: Primary | ICD-10-CM

## 2021-10-04 PROCEDURE — 99024 POSTOP FOLLOW-UP VISIT: CPT | Performed by: SURGERY

## 2021-10-15 ENCOUNTER — OFFICE VISIT (OUTPATIENT)
Dept: SURGERY | Facility: CLINIC | Age: 26
End: 2021-10-15

## 2021-10-15 VITALS
HEIGHT: 63 IN | TEMPERATURE: 97.7 F | WEIGHT: 275 LBS | BODY MASS INDEX: 48.73 KG/M2 | SYSTOLIC BLOOD PRESSURE: 126 MMHG | DIASTOLIC BLOOD PRESSURE: 78 MMHG | HEART RATE: 99 BPM

## 2021-10-15 DIAGNOSIS — L08.9 INFECTED SEBACEOUS CYST OF SKIN: Primary | ICD-10-CM

## 2021-10-15 DIAGNOSIS — L72.3 INFECTED SEBACEOUS CYST OF SKIN: Primary | ICD-10-CM

## 2021-10-15 PROCEDURE — 99024 POSTOP FOLLOW-UP VISIT: CPT | Performed by: SURGERY

## 2021-10-15 RX ORDER — CLINDAMYCIN HYDROCHLORIDE 300 MG/1
300 CAPSULE ORAL 3 TIMES DAILY
Qty: 30 CAPSULE | Refills: 0 | Status: SHIPPED | OUTPATIENT
Start: 2021-10-15 | End: 2021-10-25

## 2021-10-22 ENCOUNTER — OFFICE VISIT (OUTPATIENT)
Dept: SURGERY | Facility: CLINIC | Age: 26
End: 2021-10-22

## 2021-10-22 VITALS
TEMPERATURE: 98.1 F | DIASTOLIC BLOOD PRESSURE: 76 MMHG | BODY MASS INDEX: 49.43 KG/M2 | WEIGHT: 279 LBS | HEART RATE: 82 BPM | HEIGHT: 63 IN | SYSTOLIC BLOOD PRESSURE: 124 MMHG

## 2021-10-22 DIAGNOSIS — L72.3 INFECTED SEBACEOUS CYST OF SKIN: Primary | ICD-10-CM

## 2021-10-22 DIAGNOSIS — L73.2 HIDRADENITIS SUPPURATIVA: Primary | ICD-10-CM

## 2021-10-22 DIAGNOSIS — L08.9 INFECTED SEBACEOUS CYST OF SKIN: Primary | ICD-10-CM

## 2021-10-22 PROCEDURE — 3008F BODY MASS INDEX DOCD: CPT | Performed by: FAMILY MEDICINE

## 2021-10-22 PROCEDURE — 99024 POSTOP FOLLOW-UP VISIT: CPT | Performed by: SURGERY

## 2021-11-04 ENCOUNTER — TELEPHONE (OUTPATIENT)
Dept: OBGYN CLINIC | Facility: CLINIC | Age: 26
End: 2021-11-04

## 2021-11-09 ENCOUNTER — OFFICE VISIT (OUTPATIENT)
Dept: OBGYN CLINIC | Facility: CLINIC | Age: 26
End: 2021-11-09
Payer: COMMERCIAL

## 2021-11-09 VITALS
TEMPERATURE: 97.3 F | HEART RATE: 81 BPM | WEIGHT: 281.2 LBS | BODY MASS INDEX: 49.82 KG/M2 | HEIGHT: 63 IN | DIASTOLIC BLOOD PRESSURE: 80 MMHG | SYSTOLIC BLOOD PRESSURE: 126 MMHG

## 2021-11-09 DIAGNOSIS — E28.2 PCOS (POLYCYSTIC OVARIAN SYNDROME): Primary | ICD-10-CM

## 2021-11-09 DIAGNOSIS — N91.4 SECONDARY OLIGOMENORRHEA: ICD-10-CM

## 2021-11-09 PROCEDURE — 1036F TOBACCO NON-USER: CPT | Performed by: OBSTETRICS & GYNECOLOGY

## 2021-11-09 PROCEDURE — 3079F DIAST BP 80-89 MM HG: CPT | Performed by: OBSTETRICS & GYNECOLOGY

## 2021-11-09 PROCEDURE — 3074F SYST BP LT 130 MM HG: CPT | Performed by: OBSTETRICS & GYNECOLOGY

## 2021-11-09 PROCEDURE — 99213 OFFICE O/P EST LOW 20 MIN: CPT | Performed by: OBSTETRICS & GYNECOLOGY

## 2021-11-09 PROCEDURE — 3008F BODY MASS INDEX DOCD: CPT | Performed by: OBSTETRICS & GYNECOLOGY

## 2021-11-09 RX ORDER — ACETAMINOPHEN AND CODEINE PHOSPHATE 120; 12 MG/5ML; MG/5ML
1 SOLUTION ORAL DAILY
Qty: 28 TABLET | Refills: 6 | Status: SHIPPED | OUTPATIENT
Start: 2021-11-09 | End: 2021-12-28 | Stop reason: SDUPTHER

## 2021-12-02 ENCOUNTER — TELEMEDICINE (OUTPATIENT)
Dept: FAMILY MEDICINE CLINIC | Facility: CLINIC | Age: 26
End: 2021-12-02
Payer: COMMERCIAL

## 2021-12-02 DIAGNOSIS — Z87.09 HISTORY OF ASTHMA: ICD-10-CM

## 2021-12-02 DIAGNOSIS — R52 BODY ACHES: Primary | ICD-10-CM

## 2021-12-02 DIAGNOSIS — B34.9 VIRAL INFECTION, UNSPECIFIED: ICD-10-CM

## 2021-12-02 DIAGNOSIS — J02.9 SORE THROAT: ICD-10-CM

## 2021-12-02 PROCEDURE — U0005 INFEC AGEN DETEC AMPLI PROBE: HCPCS | Performed by: FAMILY MEDICINE

## 2021-12-02 PROCEDURE — 99213 OFFICE O/P EST LOW 20 MIN: CPT | Performed by: FAMILY MEDICINE

## 2021-12-02 PROCEDURE — U0003 INFECTIOUS AGENT DETECTION BY NUCLEIC ACID (DNA OR RNA); SEVERE ACUTE RESPIRATORY SYNDROME CORONAVIRUS 2 (SARS-COV-2) (CORONAVIRUS DISEASE [COVID-19]), AMPLIFIED PROBE TECHNIQUE, MAKING USE OF HIGH THROUGHPUT TECHNOLOGIES AS DESCRIBED BY CMS-2020-01-R: HCPCS | Performed by: FAMILY MEDICINE

## 2021-12-02 RX ORDER — ACETAMINOPHEN 500 MG
1000 TABLET ORAL EVERY 8 HOURS PRN
Qty: 30 TABLET | Refills: 0 | COMMUNITY
Start: 2021-12-02

## 2021-12-02 NOTE — PROGRESS NOTES
COVID-19 Outpatient Progress Note    Assessment/Plan:    Problem List Items Addressed This Visit     None      Visit Diagnoses     Body aches    -  Primary    Relevant Medications    acetaminophen (TYLENOL) 500 mg tablet    Other Relevant Orders    Novel Coronavirus (Covid-19),PCR [facility] - Collected in Office (Completed)    Sore throat        Relevant Orders    Novel Coronavirus (Covid-19),PCR [facility] - Collected in Office (Completed)    History of asthma        Patient to call if she developed wheezing or shortness of breath    Viral infection, unspecified        Relevant Orders    Novel Coronavirus (Covid-19),PCR [facility] - Collected in Office (Completed)         Disposition:     I recommended the patient to come to our office to perform PCR testing for COVID-19  To call if symptoms are not better or worse  to go to Er if develop SOB  I have spent 12 minutes directly with the patient  Verification of patient location:    Patient is located in the following state in which I hold an active license [place]    Encounter provider Doris Fuentes MD    Provider located at [address] [place] [address]  Via [address] [address] [phone]    Recent Visits  Date Type Provider Dept   03/17/22 Telephone Neela Hankins MA [facility]   Showing recent visits within past 7 days and meeting all other requirements  Future Appointments  No visits were found meeting these conditions    Showing future appointments within next 150 days and meeting all other requirements       Lab Results   Component Value Date    SARSCOV2 Negative 12/02/2021    SARSCOV2 NOT DETECTED 08/11/2021     Past Medical History:   Diagnosis Date    Asthma     Diabetes mellitus ([facility] [address] )     Polycystic ovarian disease      Past Surgical History:   Procedure Laterality Date    NO PAST SURGERIES      CT EXC SKIN BENIG <0 5 CM TRUNK,ARM,LEG Left 9/30/2021    Procedure: EXCISION WIDE LESION BREAST;  Surgeon: Chastity Groves MD;  Location: [facility] OR;  Service: General     Current Outpatient Medications   Medication Sig Dispense Refill    albuterol (PROVENTIL HFA,VENTOLIN HFA) 90 mcg/act inhaler Inhale 2 puffs every 6 (six) hours as needed for wheezing      acetaminophen (TYLENOL) 500 mg tablet Take 2 tablets (1,000 mg total) by mouth every 8 (eight) hours as needed for mild pain 30 tablet 0    HYDROcodone-acetaminophen (NORCO) 5-325 mg per tablet Take 1 tablet by mouth every 6 (six) hours as needed for pain for up to 5 dosesMax Daily Amount: 4 tablets (Patient not taking: Reported on 9/30/2021) 5 tablet 0    metFORMIN (GLUCOPHAGE) 850 mg tablet Take 1 tablet (850 mg total) by mouth 2 (two) times a day with meals 60 tablet 6    norethindrone (MICRONOR) 0 35 MG tablet Take 1 tablet (0 35 mg total) by mouth daily 28 tablet 6     No current facility-administered medications for this visit  No Known Allergies    Review of Systems   Constitutional: Negative for chills, diaphoresis, fatigue and fever  HENT: Positive for sore throat  Negative for ear pain, rhinorrhea, trouble swallowing and voice change  Eyes: Negative for visual disturbance  Respiratory: Positive for cough  Negative for chest tightness and shortness of breath  Cardiovascular: Negative for chest pain, palpitations and leg swelling  Gastrointestinal: Negative for abdominal pain, blood in stool, constipation, diarrhea, nausea and vomiting  Endocrine: Negative for polydipsia and polyuria  Genitourinary: Negative for dysuria, flank pain, frequency, hematuria, pelvic pain, urgency, vaginal bleeding and vaginal discharge  Musculoskeletal: Positive for myalgias  Negative for arthralgias, back pain, gait problem and neck pain  Skin: Negative for rash  Allergic/Immunologic: Negative for food allergies  Neurological: Negative for dizziness, tremors, seizures, weakness, light-headedness, numbness and headaches  Hematological: Negative for adenopathy  Does not bruise/bleed easily  Psychiatric/Behavioral: Negative for confusion and dysphoric mood  The patient is not nervous/anxious  Objective: There were no vitals filed for this visit  Physical Exam  Constitutional:       General: She is not in acute distress  Appearance: Normal appearance  She is well-developed  HENT:      Head: Normocephalic and atraumatic  Eyes:      General: No scleral icterus  Conjunctiva/sclera: Conjunctivae normal       Pupils: Pupils are equal, round, and reactive to light  Neck:      Thyroid: No thyromegaly  Vascular: No JVD  Cardiovascular:      Heart sounds: No murmur heard  Comments: Extremities  No edema  Pulmonary:      Effort: Pulmonary effort is normal       Comments: No abnormal audible breath sounds  Abdominal:      Palpations: Abdomen is soft  There is no mass  Tenderness: There is no abdominal tenderness  There is no guarding or rebound  Hernia: No hernia is present  Skin:     Coloration: Skin is not pale  Findings: No rash  Neurological:      Mental Status: She is alert and oriented to person, place, and time  Cranial Nerves: No cranial nerve deficit  Motor: No abnormal muscle tone  Coordination: Coordination normal    Psychiatric:         Mood and Affect: Mood normal          Behavior: Behavior normal          VIRTUAL VISIT Eliecer Gunderson verbally agrees to participate in [employer]  Pt is aware that [employer] could be limited without vital signs or the ability to perform a full hands-on physical Raymundorafita Jackson understands she or the provider may request at any time to terminate the video visit and request the patient to seek care or treatment in person

## 2021-12-03 LAB — SARS-COV-2 RNA RESP QL NAA+PROBE: NEGATIVE

## 2021-12-28 ENCOUNTER — ANNUAL EXAM (OUTPATIENT)
Dept: OBGYN CLINIC | Facility: CLINIC | Age: 26
End: 2021-12-28
Payer: COMMERCIAL

## 2021-12-28 VITALS
HEIGHT: 63 IN | SYSTOLIC BLOOD PRESSURE: 118 MMHG | BODY MASS INDEX: 49.61 KG/M2 | TEMPERATURE: 97.5 F | DIASTOLIC BLOOD PRESSURE: 82 MMHG | WEIGHT: 280 LBS

## 2021-12-28 DIAGNOSIS — Z11.3 SCREEN FOR STD (SEXUALLY TRANSMITTED DISEASE): ICD-10-CM

## 2021-12-28 DIAGNOSIS — E28.2 PCOS (POLYCYSTIC OVARIAN SYNDROME): ICD-10-CM

## 2021-12-28 DIAGNOSIS — Z01.419 ENCNTR FOR GYN EXAM (GENERAL) (ROUTINE) W/O ABN FINDINGS: Primary | ICD-10-CM

## 2021-12-28 DIAGNOSIS — N91.4 SECONDARY OLIGOMENORRHEA: ICD-10-CM

## 2021-12-28 DIAGNOSIS — Z12.4 CERVICAL CANCER SCREENING: ICD-10-CM

## 2021-12-28 PROCEDURE — 87591 N.GONORRHOEAE DNA AMP PROB: CPT | Performed by: OBSTETRICS & GYNECOLOGY

## 2021-12-28 PROCEDURE — 3074F SYST BP LT 130 MM HG: CPT | Performed by: OBSTETRICS & GYNECOLOGY

## 2021-12-28 PROCEDURE — 99395 PREV VISIT EST AGE 18-39: CPT | Performed by: OBSTETRICS & GYNECOLOGY

## 2021-12-28 PROCEDURE — 3079F DIAST BP 80-89 MM HG: CPT | Performed by: OBSTETRICS & GYNECOLOGY

## 2021-12-28 PROCEDURE — G0145 SCR C/V CYTO,THINLAYER,RESCR: HCPCS | Performed by: OBSTETRICS & GYNECOLOGY

## 2021-12-28 PROCEDURE — 1036F TOBACCO NON-USER: CPT | Performed by: OBSTETRICS & GYNECOLOGY

## 2021-12-28 PROCEDURE — 3008F BODY MASS INDEX DOCD: CPT | Performed by: OBSTETRICS & GYNECOLOGY

## 2021-12-28 PROCEDURE — 87491 CHLMYD TRACH DNA AMP PROBE: CPT | Performed by: OBSTETRICS & GYNECOLOGY

## 2021-12-28 RX ORDER — ACETAMINOPHEN AND CODEINE PHOSPHATE 120; 12 MG/5ML; MG/5ML
1 SOLUTION ORAL DAILY
Qty: 28 TABLET | Refills: 6 | Status: SHIPPED | OUTPATIENT
Start: 2021-12-28

## 2021-12-30 LAB
C TRACH DNA SPEC QL NAA+PROBE: NEGATIVE
N GONORRHOEA DNA SPEC QL NAA+PROBE: NEGATIVE

## 2022-01-06 LAB
LAB AP GYN PRIMARY INTERPRETATION: NORMAL
Lab: NORMAL
PATH INTERP SPEC-IMP: NORMAL

## 2022-03-17 ENCOUNTER — TELEPHONE (OUTPATIENT)
Dept: FAMILY MEDICINE CLINIC | Facility: CLINIC | Age: 27
End: 2022-03-17

## 2022-03-17 NOTE — TELEPHONE ENCOUNTER
There is an open encounter from 12/02/2021 virtual visit for the patient it looks like the encounter is not signed it still open please see and close the encounter

## 2023-06-30 ENCOUNTER — TELEPHONE (OUTPATIENT)
Dept: FAMILY MEDICINE CLINIC | Facility: CLINIC | Age: 28
End: 2023-06-30

## 2023-06-30 NOTE — TELEPHONE ENCOUNTER
I left message in Guatemalan for patient to call back and make an apt if she is staying here in the practice with us  I left the message in Guatemalan

## 2023-07-07 ENCOUNTER — TELEPHONE (OUTPATIENT)
Dept: FAMILY MEDICINE CLINIC | Facility: CLINIC | Age: 28
End: 2023-07-07

## 2023-07-07 NOTE — TELEPHONE ENCOUNTER
I called patient and left a message in Azeri asking for patient to call us back to help her set up an apt with Dr Pierre Wells.

## 2023-07-10 ENCOUNTER — TELEPHONE (OUTPATIENT)
Age: 28
End: 2023-07-10

## 2023-07-12 ENCOUNTER — TELEPHONE (OUTPATIENT)
Dept: FAMILY MEDICINE CLINIC | Facility: CLINIC | Age: 28
End: 2023-07-12

## 2023-07-12 NOTE — TELEPHONE ENCOUNTER
I called patient and no answer I left a detailed message in Slovenian for her to call us back, looking in her chart this would be the fourth time that we tried contacting patient.

## 2023-07-12 NOTE — LETTER
Dear Makenna Rojas    Records from Insurance indicate that you are a patient of Dr. Ya Scanlon, with Jimena Lu, [facility]. Please call the office to establish care and schedule your annual physical today at [phone]. If you are seeing a primary care provider other than the one assigned to you by your insurance, you can simply call the number on the back of your insurance card to change your primary care physician with your insurance company. We thank you for choosing [address] [place] [address] for your healthcare needs.     Sincerely,  Jimena Lu

## 2023-11-20 ENCOUNTER — HOSPITAL ENCOUNTER (EMERGENCY)
Facility: HOSPITAL | Age: 28
Discharge: HOME | End: 2023-11-20
Attending: EMERGENCY MEDICINE | Admitting: EMERGENCY MEDICINE
Payer: MEDICARE

## 2023-11-20 VITALS
DIASTOLIC BLOOD PRESSURE: 95 MMHG | OXYGEN SATURATION: 97 % | RESPIRATION RATE: 18 BRPM | WEIGHT: 292.2 LBS | HEART RATE: 77 BPM | TEMPERATURE: 97.8 F | SYSTOLIC BLOOD PRESSURE: 181 MMHG | BODY MASS INDEX: 51.76 KG/M2

## 2023-11-20 DIAGNOSIS — A08.4 VIRAL GASTROENTERITIS: Primary | ICD-10-CM

## 2023-11-20 LAB
FLUAV RNA RESP QL NAA+PROBE: NEGATIVE
FLUBV RNA RESP QL NAA+PROBE: NEGATIVE
RSV RNA RESP QL NAA+PROBE: NEGATIVE
SARS-COV-2 RNA RESP QL NAA+PROBE: NEGATIVE

## 2023-11-20 PROCEDURE — 99283 EMERGENCY DEPT VISIT LOW MDM: CPT

## 2023-11-20 PROCEDURE — 99284 EMERGENCY DEPT VISIT MOD MDM: CPT | Performed by: EMERGENCY MEDICINE

## 2023-11-20 PROCEDURE — 0241U HB NFCT DS VIR RESP RNA 4 TRGT: CPT | Performed by: EMERGENCY MEDICINE

## 2023-11-20 RX ORDER — ONDANSETRON 4 MG/1
4 TABLET, FILM COATED ORAL EVERY 6 HOURS
Qty: 12 TABLET | Refills: 0 | Status: SHIPPED | OUTPATIENT
Start: 2023-11-20

## 2023-11-20 RX ORDER — ONDANSETRON 4 MG/1
4 TABLET, ORALLY DISINTEGRATING ORAL ONCE
Status: COMPLETED | OUTPATIENT
Start: 2023-11-20 | End: 2023-11-20

## 2023-11-20 RX ADMIN — ONDANSETRON 4 MG: 4 TABLET, ORALLY DISINTEGRATING ORAL at 19:03

## 2023-11-20 NOTE — Clinical Note
Danny De La Paz was seen and treated in our emergency department on 11/20/2023. No restrictions            Diagnosis:     Reyna  . She may return on this date: If you have any questions or concerns, please don't hesitate to call.       Jeffery Godoy, DO    ______________________________           _______________          _______________  Hospital Representative                              Date                                Time

## 2023-11-21 NOTE — DISCHARGE INSTRUCTIONS
Return if symptoms worsen or if new symptoms develop, drink plenty of fluids. She should feel better in the next 2 to 3 days.

## 2023-11-23 NOTE — ED PROVIDER NOTES
History  Chief Complaint   Patient presents with    Diarrhea     Patient arrives with complaints of vomiting and diarrhea that started today. 18-year-old female with history of PCOS, diabetes presents with nausea vomiting, generalized intermittent cramping abdominal pain. She also reports diffuse myalgias. Patient is Upper sorbian-speaking, speaks some English however I started up the professional  and she refused and used her partner who was in the room. Prior to Admission Medications   Prescriptions Last Dose Informant Patient Reported? Taking?    HYDROcodone-acetaminophen (NORCO) 5-325 mg per tablet   No No   Sig: Take 1 tablet by mouth every 6 (six) hours as needed for pain for up to 5 dosesMax Daily Amount: 4 tablets   Patient not taking: Reported on 9/30/2021   acetaminophen (TYLENOL) 500 mg tablet   No No   Sig: Take 2 tablets (1,000 mg total) by mouth every 8 (eight) hours as needed for mild pain   albuterol (PROVENTIL HFA,VENTOLIN HFA) 90 mcg/act inhaler   Yes No   Sig: Inhale 2 puffs every 6 (six) hours as needed for wheezing   metFORMIN (GLUCOPHAGE) 850 mg tablet   No No   Sig: Take 1 tablet (850 mg total) by mouth 2 (two) times a day with meals   norethindrone (MICRONOR) 0.35 MG tablet   No No   Sig: Take 1 tablet (0.35 mg total) by mouth daily      Facility-Administered Medications: None       Past Medical History:   Diagnosis Date    Asthma     Diabetes mellitus ([address])     Polycystic ovarian disease        Past Surgical History:   Procedure Laterality Date    NO PAST SURGERIES      DE EXC B9 LESION MRGN XCP SK TG T/A/L 0.5 CM/< Left 9/30/2021    Procedure: EXCISION WIDE LESION BREAST;  Surgeon: Nancey Prader, MD;  Location: [facility] OR;  Service: General       Family History   Problem Relation Age of Onset    Hypertension Mother     Diabetes Mother     Asthma Mother     Arthritis Mother     Alcohol abuse Father     No Known Problems Sister     No Known Problems Brother     No Known Problems Brother     No Known Problems Brother     No Known Problems Brother     No Known Problems Brother     No Known Problems Brother      I have reviewed and agree with the history as documented. E-Cigarette/Vaping    E-Cigarette Use Never User      E-Cigarette/Vaping Substances    Nicotine No     THC No     CBD No     Flavoring No     Other No     Unknown No      Social History     Tobacco Use    Smoking status: Never    Smokeless tobacco: Never   Vaping Use    Vaping Use: Never used   Substance Use Topics    Alcohol use: Not Currently     Alcohol/week: 6.0 standard drinks of alcohol     Types: 6 Cans of beer per week     Comment: social    Drug use: Never       Review of Systems    Physical Exam  Physical Exam  Vitals and nursing note reviewed. Constitutional:       Appearance: Normal appearance. She is well-developed. HENT:      Head: Normocephalic and atraumatic. Eyes:      Conjunctiva/sclera: Conjunctivae normal.      Pupils: Pupils are equal, round, and reactive to light. Neck:      Trachea: No tracheal deviation. Cardiovascular:      Rate and Rhythm: Normal rate and regular rhythm. Heart sounds: Normal heart sounds. No murmur heard. Pulmonary:      Effort: Pulmonary effort is normal. No respiratory distress. Breath sounds: Normal breath sounds. No wheezing or rales. Abdominal:      General: Bowel sounds are normal. There is no distension. Palpations: Abdomen is soft. Tenderness: There is no abdominal tenderness. Musculoskeletal:         General: No deformity. Cervical back: Normal range of motion and neck supple. Skin:     General: Skin is warm and dry. Capillary Refill: Capillary refill takes less than 2 seconds. Neurological:      General: No focal deficit present. Mental Status: She is alert and oriented to person, place, and time. Sensory: No sensory deficit.    Psychiatric:         Mood and Affect: Mood normal.         Judgment: Judgment normal.         Vital Signs  ED Triage Vitals [11/20/23 1744]   Temperature Pulse Respirations Blood Pressure SpO2   97.8 °F (36.6 °C) 77 18 (!) 181/95 97 %      Temp Source Heart Rate Source Patient Position - Orthostatic VS BP Location FiO2 (%)   Temporal Monitor -- -- --      Pain Score       --           Vitals:    11/20/23 1744   BP: (!) 181/95   Pulse: 77         Visual Acuity      ED Medications  Medications   ondansetron (ZOFRAN-ODT) dispersible tablet 4 mg (4 mg Oral Given 11/20/23 1903)       Diagnostic Studies  Results Reviewed       Procedure Component Value Units Date/Time    FLU/RSV/COVID - if FLU/RSV clinically relevant [[id number]]  (Normal) Collected: 11/20/23 1902    Lab Status: Final result Specimen: Nares from Nose Updated: 11/20/23 1953     SARS-CoV-2 Negative     INFLUENZA A PCR Negative     INFLUENZA B PCR Negative     RSV PCR Negative    Narrative:      FOR PEDIATRIC PATIENTS - copy/paste COVID Guidelines URL to browser: [email or web address]    SARS-CoV-2 assay is a Nucleic Acid Amplification assay intended for the  qualitative detection of nucleic acid from SARS-CoV-2 in nasopharyngeal  swabs. Results are for the presumptive identification of SARS-CoV-2 RNA. Positive results are indicative of infection with SARS-CoV-2, the virus  causing COVID-19, but do not rule out bacterial infection or co-infection  with other viruses. Laboratories within the [facility] and its  territories are required to report all positive results to the appropriate  public health authorities. Negative results do not preclude SARS-CoV-2  infection and should not be used as the sole basis for treatment or other  patient management decisions. Negative results must be combined with  clinical observations, patient history, and epidemiological information. This test has not been FDA cleared or approved.     This test has been authorized by FDA under an Emergency Use Authorization  (EUA). This test is only authorized for the duration of time the  declaration that circumstances exist justifying the authorization of the  emergency use of an in vitro diagnostic tests for detection of SARS-CoV-2  virus and/or diagnosis of COVID-19 infection under section 564(b)(1) of  the Act, 21 U. S.C. 586BOD-4(K)(8), unless the authorization is terminated  or revoked sooner. The test has been validated but independent review by FDA  and CLIA is pending. Test performed using [employer] GeneXpert: This RT-PCR assay targets N2,  a region unique to SARS-CoV-2. A conserved region in the E-gene was chosen  for pan-Sarbecovirus detection which includes SARS-CoV-2. According to CMS-2020-01-R, this platform meets the definition of high-throughput technology. No orders to display              Procedures  Procedures         ED Course                               SBIRT 20yo+      Flowsheet Row Most Recent Value   Initial Alcohol Screen: US AUDIT-C     1. How often do you have a drink containing alcohol? 0 Filed at: 11/20/2023 1746   2. How many drinks containing alcohol do you have on a typical day you are drinking? 0 Filed at: 11/20/2023 1746   3a. Male UNDER 65: How often do you have five or more drinks on one occasion? 0 Filed at: 11/20/2023 1746   3b. FEMALE Any Age, or MALE 65+: How often do you have 4 or more drinks on one occassion? 0 Filed at: 11/20/2023 1746   Audit-C Score 0 Filed at: 11/20/2023 1746   FABIAN: How many times in the past year have you. .. Used an illegal drug or used a prescription medication for non-medical reasons? Never Filed at: 11/20/2023 1746                      Medical Decision Making  Pleasant 58-year-old female presenting with symptoms consistent with a viral gastroenteritis. Abdominal exam nontender. Pain is intermittent. Not consistent with acute surgical pathology such as appendicitis.     Patient was given Zofran, tolerating po in the emergency department and is comfortable with discharge. Will prescribe short course of Zofran, recommend isolating. Flu COVID and RSV test were negative. Suspect patient has flulike illness, overall low risk patient even if she does have influenza. Problems Addressed:  Viral gastroenteritis: acute illness or injury    Amount and/or Complexity of Data Reviewed  Labs: ordered. Decision-making details documented in ED Course. Risk  Prescription drug management. Disposition  Final diagnoses:   Viral gastroenteritis     Time reflects when diagnosis was documented in both MDM as applicable and the Disposition within this note       Time User Action Codes Description Comment    11/20/2023  8:09 PM Aye Gallardo Add [A08.4] Viral gastroenteritis           ED Disposition       ED Disposition   Discharge    Condition   Stable    Date/Time   Mon Nov 20, 2023 2009    [facility] [place] discharge to home/self care.                    Follow-up Information       Follow up With Specialties Details Why Contact Info Additional Information    [address] [facility] In 1 day As needed [address] [address]  [phone] [address], [address], [facility], [address] [place] [address],[address], [address], [phone]            Discharge Medication List as of 11/20/2023  8:12 PM        START taking these medications    Details   ondansetron (ZOFRAN) 4 mg tablet Take 1 tablet (4 mg total) by mouth every 6 (six) hours, Starting Mon 11/20/2023, Normal           CONTINUE these medications which have NOT CHANGED    Details   acetaminophen (TYLENOL) 500 mg tablet Take 2 tablets (1,000 mg total) by mouth every 8 (eight) hours as needed for mild pain, Starting u 12/2/2021, OTC      albuterol (PROVENTIL HFA,VENTOLIN HFA) 90 mcg/act inhaler Inhale 2 puffs every 6 (six) hours as needed for wheezing, Historical Med      HYDROcodone-acetaminophen ([address]) 5-325 mg per tablet Take 1 tablet by mouth every 6 (six) hours as needed for pain for up to 5 dosesMax Daily Amount: 4 tablets, Starting Thu 9/30/2021, Normal      metFORMIN (GLUCOPHAGE) 850 mg tablet Take 1 tablet (850 mg total) by mouth 2 (two) times a day with meals, Starting Tue 12/28/2021, Normal      norethindrone (MICRONOR) 0.35 MG tablet Take 1 tablet (0.35 mg total) by mouth daily, Starting Tue 12/28/2021, Normal             No discharge procedures on file.     PDMP Review       None            ED Provider  Electronically Signed by             Brandie De La Vega DO  11/22/23 7816

## 2023-11-29 ENCOUNTER — HOSPITAL ENCOUNTER (EMERGENCY)
Facility: HOSPITAL | Age: 28
Discharge: HOME | End: 2023-11-29
Attending: EMERGENCY MEDICINE
Payer: MEDICARE

## 2023-11-29 ENCOUNTER — APPOINTMENT (EMERGENCY)
Dept: CT IMAGING | Facility: HOSPITAL | Age: 28
End: 2023-11-29
Payer: MEDICARE

## 2023-11-29 VITALS
OXYGEN SATURATION: 100 % | WEIGHT: 293 LBS | SYSTOLIC BLOOD PRESSURE: 142 MMHG | TEMPERATURE: 97.9 F | HEIGHT: 62 IN | RESPIRATION RATE: 18 BRPM | HEART RATE: 69 BPM | DIASTOLIC BLOOD PRESSURE: 79 MMHG | BODY MASS INDEX: 53.92 KG/M2

## 2023-11-29 DIAGNOSIS — R10.9 FLANK PAIN: Primary | ICD-10-CM

## 2023-11-29 LAB
ALBUMIN SERPL BCP-MCNC: 4.1 G/DL (ref 3.5–5)
ALP SERPL-CCNC: 67 U/L (ref 34–104)
ALT SERPL W P-5'-P-CCNC: 23 U/L (ref 7–52)
ANION GAP SERPL CALCULATED.3IONS-SCNC: 7 MMOL/L
AST SERPL W P-5'-P-CCNC: 15 U/L (ref 13–39)
BASOPHILS # BLD AUTO: 0.01 THOUSANDS/ÂΜL (ref 0–0.1)
BASOPHILS NFR BLD AUTO: 0 % (ref 0–1)
BILIRUB SERPL-MCNC: 0.47 MG/DL (ref 0.2–1)
BUN SERPL-MCNC: 12 MG/DL (ref 5–25)
CALCIUM SERPL-MCNC: 8.7 MG/DL (ref 8.4–10.2)
CHLORIDE SERPL-SCNC: 102 MMOL/L (ref 96–108)
CO2 SERPL-SCNC: 27 MMOL/L (ref 21–32)
CREAT SERPL-MCNC: 0.67 MG/DL (ref 0.6–1.3)
EOSINOPHIL # BLD AUTO: 0.18 THOUSAND/ÂΜL (ref 0–0.61)
EOSINOPHIL NFR BLD AUTO: 3 % (ref 0–6)
ERYTHROCYTE [DISTWIDTH] IN BLOOD BY AUTOMATED COUNT: 14.3 % (ref 11.6–15.1)
GFR SERPL CREATININE-BSD FRML MDRD: 119 ML/MIN/1.73SQ M
GLUCOSE SERPL-MCNC: 103 MG/DL (ref 65–140)
HCG SERPL QL: NEGATIVE
HCT VFR BLD AUTO: 39 % (ref 34.8–46.1)
HGB BLD-MCNC: 12 G/DL (ref 11.5–15.4)
IMM GRANULOCYTES # BLD AUTO: 0.01 THOUSAND/UL (ref 0–0.2)
IMM GRANULOCYTES NFR BLD AUTO: 0 % (ref 0–2)
LIPASE SERPL-CCNC: 12 U/L (ref 11–82)
LYMPHOCYTES # BLD AUTO: 2.15 THOUSANDS/ÂΜL (ref 0.6–4.47)
LYMPHOCYTES NFR BLD AUTO: 36 % (ref 14–44)
MCH RBC QN AUTO: 25.9 PG (ref 26.8–34.3)
MCHC RBC AUTO-ENTMCNC: 30.8 G/DL (ref 31.4–37.4)
MCV RBC AUTO: 84 FL (ref 82–98)
MONOCYTES # BLD AUTO: 0.47 THOUSAND/ÂΜL (ref 0.17–1.22)
MONOCYTES NFR BLD AUTO: 8 % (ref 4–12)
NEUTROPHILS # BLD AUTO: 3.16 THOUSANDS/ÂΜL (ref 1.85–7.62)
NEUTS SEG NFR BLD AUTO: 53 % (ref 43–75)
NRBC BLD AUTO-RTO: 0 /100 WBCS
PLATELET # BLD AUTO: 289 THOUSANDS/UL (ref 149–390)
PMV BLD AUTO: 9.8 FL (ref 8.9–12.7)
POTASSIUM SERPL-SCNC: 4.3 MMOL/L (ref 3.5–5.3)
PROT SERPL-MCNC: 7.9 G/DL (ref 6.4–8.4)
RBC # BLD AUTO: 4.64 MILLION/UL (ref 3.81–5.12)
SODIUM SERPL-SCNC: 136 MMOL/L (ref 135–147)
WBC # BLD AUTO: 5.98 THOUSAND/UL (ref 4.31–10.16)

## 2023-11-29 PROCEDURE — G1004 CDSM NDSC: HCPCS

## 2023-11-29 PROCEDURE — 99284 EMERGENCY DEPT VISIT MOD MDM: CPT | Performed by: EMERGENCY MEDICINE

## 2023-11-29 PROCEDURE — 99284 EMERGENCY DEPT VISIT MOD MDM: CPT

## 2023-11-29 PROCEDURE — 74176 CT ABD & PELVIS W/O CONTRAST: CPT

## 2023-11-29 PROCEDURE — 96375 TX/PRO/DX INJ NEW DRUG ADDON: CPT

## 2023-11-29 PROCEDURE — 36415 COLL VENOUS BLD VENIPUNCTURE: CPT | Performed by: EMERGENCY MEDICINE

## 2023-11-29 PROCEDURE — 85025 COMPLETE CBC W/AUTO DIFF WBC: CPT | Performed by: EMERGENCY MEDICINE

## 2023-11-29 PROCEDURE — 80053 COMPREHEN METABOLIC PANEL: CPT | Performed by: EMERGENCY MEDICINE

## 2023-11-29 PROCEDURE — 96374 THER/PROPH/DIAG INJ IV PUSH: CPT

## 2023-11-29 PROCEDURE — 96361 HYDRATE IV INFUSION ADD-ON: CPT

## 2023-11-29 PROCEDURE — 84703 CHORIONIC GONADOTROPIN ASSAY: CPT | Performed by: EMERGENCY MEDICINE

## 2023-11-29 PROCEDURE — 83690 ASSAY OF LIPASE: CPT | Performed by: EMERGENCY MEDICINE

## 2023-11-29 RX ORDER — ONDANSETRON 2 MG/ML
4 INJECTION INTRAMUSCULAR; INTRAVENOUS ONCE
Status: COMPLETED | OUTPATIENT
Start: 2023-11-29 | End: 2023-11-29

## 2023-11-29 RX ORDER — KETOROLAC TROMETHAMINE 30 MG/ML
30 INJECTION, SOLUTION INTRAMUSCULAR; INTRAVENOUS ONCE
Status: COMPLETED | OUTPATIENT
Start: 2023-11-29 | End: 2023-11-29

## 2023-11-29 RX ADMIN — ONDANSETRON 4 MG: 2 INJECTION INTRAMUSCULAR; INTRAVENOUS at 00:52

## 2023-11-29 RX ADMIN — KETOROLAC TROMETHAMINE 30 MG: 30 INJECTION, SOLUTION INTRAMUSCULAR at 02:12

## 2023-11-29 RX ADMIN — SODIUM CHLORIDE 1000 ML: 0.9 INJECTION, SOLUTION INTRAVENOUS at 00:51

## 2023-11-29 NOTE — DISCHARGE INSTRUCTIONS
RETURN IF WORSE IN ANY WAY:   WORSENING PAIN,   FEVER OR FLU LIKE SYMPTOMS,   OR NEW AND CONCERNING SYMPTOMS SIGNS OR SYMPTOMS      PLEASE CALL YOUR PRIMARY DOCTOR IN THE MORNING TO SET UP FOLLOW UP for TOMORROW or the Next day  PLEASE REVIEW THE WORK UP RESULTS WITH YOUR DOCTOR  Please continue to drink plenty of fluids.   He can take Zofran for nausea or vomiting  If your diarrhea continues over the next day, you can start Imodium, as directed

## 2023-11-29 NOTE — Clinical Note
Tenisha Rodriguez was seen and treated in our emergency department on 11/29/2023. Diagnosis:     Lulú Cheney  may return to work on return date. She may return on this date: 11/30/2023         If you have any questions or concerns, please don't hesitate to call.       Shun Hernandez MD    ______________________________           _______________          _______________  Hospital Representative                              Date                                Time

## 2023-11-29 NOTE — ED PROVIDER NOTES
History  Chief Complaint   Patient presents with    Flank Pain     LEFT FLANK PAIN, started yesterday morning but became worse tonight. Took tylenol PTA       26-YEAR-OLD FEMALE    PMH  None    Chief complaint:  PATIENT IS HERE FOR LEFT FLANK PAIN, RADIATING INTO THE LEFT ABDOMEN      HPI:  STATES THIS STARTED about 1 hour ago  PAIN IS NOW  RATED 5/10, WAS WORSE PTA   DESCRIBED AS SHARP    ASSOCIATED SYMPTOMS:  URINARY  SYMPTOMS: THERE IS NO DYSURIA, NO HEMATURIA, NO FREQUENCY  SHE DENIES VOMITING. REPORTS NAUSEA. NO FEVERS OR CHILLS   NO STOOL CHANGES         INTERVENTIONS: TYLENOL PTA            History provided by:  Patient      Prior to Admission Medications   Prescriptions Last Dose Informant Patient Reported? Taking?    HYDROcodone-acetaminophen (NORCO) 5-325 mg per tablet   No No   Sig: Take 1 tablet by mouth every 6 (six) hours as needed for pain for up to 5 dosesMax Daily Amount: 4 tablets   Patient not taking: Reported on 9/30/2021   acetaminophen (TYLENOL) 500 mg tablet   No No   Sig: Take 2 tablets (1,000 mg total) by mouth every 8 (eight) hours as needed for mild pain   albuterol (PROVENTIL HFA,VENTOLIN HFA) 90 mcg/act inhaler   Yes No   Sig: Inhale 2 puffs every 6 (six) hours as needed for wheezing   metFORMIN (GLUCOPHAGE) 850 mg tablet   No No   Sig: Take 1 tablet (850 mg total) by mouth 2 (two) times a day with meals   norethindrone (MICRONOR) 0.35 MG tablet   No No   Sig: Take 1 tablet (0.35 mg total) by mouth daily   ondansetron (ZOFRAN) 4 mg tablet   No No   Sig: Take 1 tablet (4 mg total) by mouth every 6 (six) hours      Facility-Administered Medications: None       Past Medical History:   Diagnosis Date    Asthma     Diabetes mellitus ([address])     Polycystic ovarian disease        Past Surgical History:   Procedure Laterality Date    NO PAST SURGERIES      KS EXC B9 LESION MRGN XCP SK TG T/A/L 0.5 CM/< Left 9/30/2021    Procedure: EXCISION WIDE LESION BREAST;  Surgeon: Lisa Smith MD; Location: [facility] OR;  Service: General       Family History   Problem Relation Age of Onset    Hypertension Mother     Diabetes Mother     Asthma Mother     Arthritis Mother     Alcohol abuse Father     No Known Problems Sister     No Known Problems Brother     No Known Problems Brother     No Known Problems Brother     No Known Problems Brother     No Known Problems Brother     No Known Problems Brother      I have reviewed and agree with the history as documented. E-Cigarette/Vaping    E-Cigarette Use Never User      E-Cigarette/Vaping Substances    Nicotine No     THC No     CBD No     Flavoring No     Other No     Unknown No      Social History     Tobacco Use    Smoking status: Never    Smokeless tobacco: Never   Vaping Use    Vaping Use: Never used   Substance Use Topics    Alcohol use: Not Currently     Alcohol/week: 6.0 standard drinks of alcohol     Types: 6 Cans of beer per week     Comment: social    Drug use: Never       Review of Systems   Constitutional:  Negative for chills, diaphoresis, fatigue and fever. Respiratory:  Negative for cough, shortness of breath, wheezing and stridor. Cardiovascular:  Negative for chest pain, palpitations and leg swelling. Gastrointestinal:  Positive for abdominal pain and nausea. Negative for blood in stool, diarrhea and vomiting. Genitourinary:  Negative for difficulty urinating, dysuria, flank pain and frequency. Musculoskeletal:  Negative for arthralgias, back pain, gait problem, joint swelling, myalgias, neck pain and neck stiffness. LEFT FLANK PAIN    Skin:  Negative for rash and wound. Neurological:  Negative for dizziness, light-headedness and headaches. All other systems reviewed and are negative. Physical Exam  Physical Exam  Constitutional:       General: She is not in acute distress. Appearance: Normal appearance. She is well-developed. She is not ill-appearing, toxic-appearing or diaphoretic.    HENT:      Head: Normocephalic and atraumatic. Right Ear: External ear normal.      Left Ear: External ear normal.      Nose: Nose normal. No congestion or rhinorrhea. Mouth/Throat:      Pharynx: No oropharyngeal exudate or posterior oropharyngeal erythema. Eyes:      General: No scleral icterus. Right eye: No discharge. Left eye: No discharge. Extraocular Movements: Extraocular movements intact. Conjunctiva/sclera: Conjunctivae normal.      Pupils: Pupils are equal, round, and reactive to light. Neck:      Vascular: No JVD. Trachea: No tracheal deviation. Cardiovascular:      Rate and Rhythm: Regular rhythm. Pulses: Normal pulses. Heart sounds: Normal heart sounds. No murmur heard. No friction rub. No gallop. Pulmonary:      Effort: Pulmonary effort is normal. No respiratory distress. Breath sounds: Normal breath sounds. No stridor. No wheezing, rhonchi or rales. Chest:      Chest wall: No tenderness. Abdominal:      General: Bowel sounds are normal. There is no distension. Palpations: Abdomen is soft. There is no mass. Tenderness: There is no abdominal tenderness. There is left CVA tenderness. There is no right CVA tenderness, guarding or rebound. Hernia: No hernia is present. Musculoskeletal:         General: No swelling, tenderness, deformity or signs of injury. Normal range of motion. Cervical back: Normal range of motion and neck supple. No rigidity or tenderness. Right lower leg: No edema. Left lower leg: No edema. Lymphadenopathy:      Cervical: No cervical adenopathy. Skin:     General: Skin is warm. Capillary Refill: Capillary refill takes less than 2 seconds. Coloration: Skin is not jaundiced or pale. Findings: No bruising, erythema, lesion or rash. Neurological:      General: No focal deficit present. Mental Status: She is alert and oriented to person, place, and time.  Mental status is at baseline. Cranial Nerves: No cranial nerve deficit. Sensory: No sensory deficit. Motor: No weakness or abnormal muscle tone. Coordination: Coordination normal.   Psychiatric:         Mood and Affect: Mood normal.         Behavior: Behavior normal.         Thought Content: Thought content normal.         Judgment: Judgment normal.         Vital Signs  ED Triage Vitals [11/29/23 0031]   Temp Pulse Respirations Blood Pressure SpO2   -- 69 18 142/79 100 %      Temp src Heart Rate Source Patient Position - Orthostatic VS BP Location FiO2 (%)   -- Monitor -- Left arm --      Pain Score       5           Vitals:    11/29/23 0031   BP: 142/79   Pulse: 69         Visual Acuity      ED Medications  Medications - No data to display    Diagnostic Studies  Results Reviewed       None                   No orders to display              Procedures  Procedures         ED Course  ED Course as of 11/29/23 0651   Wed Nov 29, 2023   0044 Pt seen and evaluated  Non toxic  Vss and wnl    0106 CBC and differential(!)   0144 CMP   0144 Lipase: 12   0144 PREGNANCY, SERUM: Negative   0206 CT ABDOMEN AND PELVIS WITHOUT IV CONTRAST     INDICATION:   Flank pain, kidney stone suspected  flank pain. COMPARISON:  None. TECHNIQUE:  CT examination of the abdomen and pelvis was performed without intravenous contrast. Multiplanar 2D reformatted images were created from the source data. This examination, like all CT scans performed in the [facility], was performed utilizing techniques to minimize radiation dose exposure, including the use of iterative reconstruction and automated exposure control. Radiation dose length   product (DLP) for this visit:  1956.56 mGy-cm     Enteric contrast was not administered.      FINDINGS:     ABDOMEN     LOWER CHEST:  No clinically significant abnormality identified in the visualized lower chest.     LIVER/BILIARY TREE:  Liver is diffusely decreased in density consistent with fatty change. No CT evidence of suspicious hepatic mass. Normal hepatic contours. No biliary dilatation. GALLBLADDER:  No calcified gallstones. No pericholecystic inflammatory change. SPLEEN:  Unremarkable. PANCREAS:  Unremarkable. ADRENAL GLANDS:  Unremarkable. KIDNEYS/URETERS:  Unremarkable. No hydronephrosis. STOMACH AND BOWEL:  Unremarkable. APPENDIX:  A normal appendix was visualized. ABDOMINOPELVIC CAVITY:  No ascites. No pneumoperitoneum. No lymphadenopathy. VESSELS:  Unremarkable for patient's age. PELVIS     REPRODUCTIVE ORGANS: There is an anterior fundal fibroid measuring 8.3 cm. URINARY BLADDER:  Unremarkable. ABDOMINAL WALL/INGUINAL REGIONS:  Unremarkable. OSSEOUS STRUCTURES:  No acute fracture or destructive osseous lesion. IMPRESSION:     No acute intra-abdominal abnormality. No hydronephrosis or intrarenal calculus. Large fundal fibroid. If symptoms persist, further evaluation with nonemergent pelvic ultrasound is suggested. Medical Decision Making  Patient with history as above presented with Patient presents with:  Flank Pain: LEFT FLANK PAIN, started yesterday morning but became worse tonight. Took tylenol PTA    History obtained from patient, significant other    Patient was nontoxic, stable. Ambulatory. Exam as above. Labs reviewed. Independently reviewed imaging. Differential diagnosis considered. Overall presentation is consistent with   Low suspicion for kidney stone, kidney infection, surgical process, life or limb threatening process     Patient was treated with Toradol, Zofran, with improvement in symptoms. No Consideration was given for admission, as the pt's ED course, work up results and pt's desire to go home all supported the decision that the patient was stable for outpatient management.     Disposition:   Discussed need to follow up with PCP and Gynecology  Discharged with instructions to obtain outpatient follow up of patient's symptoms and findings, with strict return precautions if patient develops new or worsening symptoms. This medical documentation was created using an electronic medical record system with [facility] Modal voice recognition. Although this document has been carefully reviewed, there may still be some phonetic and typographical errors. These errors are purely typographical and due to imperfections of the software program, do not reflect any compromise in the patient's medical care. Amount and/or Complexity of Data Reviewed  Labs: ordered. Decision-making details documented in ED Course. Radiology: ordered. Risk  Prescription drug management. Disposition  Final diagnoses:   None     ED Disposition       None          Follow-up Information    None         Patient's Medications   Discharge Prescriptions    No medications on file       No discharge procedures on file.     PDMP Review       None            ED Provider  Electronically Signed by             Poornima Claudio MD  12/02/23 7147

## 2023-12-19 ENCOUNTER — HOSPITAL ENCOUNTER (EMERGENCY)
Facility: HOSPITAL | Age: 28
Discharge: HOME | End: 2023-12-19
Attending: EMERGENCY MEDICINE
Payer: OTHER MISCELLANEOUS

## 2023-12-19 ENCOUNTER — APPOINTMENT (EMERGENCY)
Dept: RADIOLOGY | Facility: HOSPITAL | Age: 28
End: 2023-12-19
Payer: OTHER MISCELLANEOUS

## 2023-12-19 VITALS
SYSTOLIC BLOOD PRESSURE: 177 MMHG | RESPIRATION RATE: 17 BRPM | OXYGEN SATURATION: 98 % | DIASTOLIC BLOOD PRESSURE: 97 MMHG | HEART RATE: 66 BPM | TEMPERATURE: 97.8 F

## 2023-12-19 DIAGNOSIS — S69.90XA WRIST INJURY: Primary | ICD-10-CM

## 2023-12-19 PROCEDURE — 99284 EMERGENCY DEPT VISIT MOD MDM: CPT | Performed by: EMERGENCY MEDICINE

## 2023-12-19 PROCEDURE — 99283 EMERGENCY DEPT VISIT LOW MDM: CPT

## 2023-12-19 PROCEDURE — 73110 X-RAY EXAM OF WRIST: CPT

## 2023-12-19 NOTE — Clinical Note
Reyna Orosco was seen and treated in our emergency department on 12/19/2023.    Occupational Medicine Follow Up Within 24 hours            Diagnosis:     Reyna  .    She may return on this date:          If you have any questions or concerns, please don't hesitate to call.      Shola Bruno, DO    ______________________________           _______________          _______________  Hospital Representative                              Date                                Time

## 2023-12-20 NOTE — DISCHARGE INSTRUCTIONS
Apply ice every 3-4 hours while awake for 20 minutes  Take ibuprofen  If symptoms persist follow up with your employers occupational medicine or orthopedics

## 2023-12-21 NOTE — ED PROVIDER NOTES
History  Chief Complaint   Patient presents with    Wrist Injury     Pt reports a box fell on her right wrist at work today. Has pain and has a splint appllied     28-year-old female presents with right-sided wrist pain.  Patient was at work and a box hit her in the right wrist.  Denies weakness or numbness.  Pain is located on the right medial/ulnar side of the wrist.  Worse with movement.          Prior to Admission Medications   Prescriptions Last Dose Informant Patient Reported? Taking?   HYDROcodone-acetaminophen (NORCO) 5-325 mg per tablet   No No   Sig: Take 1 tablet by mouth every 6 (six) hours as needed for pain for up to 5 dosesMax Daily Amount: 4 tablets   Patient not taking: Reported on 9/30/2021   acetaminophen (TYLENOL) 500 mg tablet   No No   Sig: Take 2 tablets (1,000 mg total) by mouth every 8 (eight) hours as needed for mild pain   albuterol (PROVENTIL HFA,VENTOLIN HFA) 90 mcg/act inhaler   Yes No   Sig: Inhale 2 puffs every 6 (six) hours as needed for wheezing   metFORMIN (GLUCOPHAGE) 850 mg tablet   No No   Sig: Take 1 tablet (850 mg total) by mouth 2 (two) times a day with meals   norethindrone (MICRONOR) 0.35 MG tablet   No No   Sig: Take 1 tablet (0.35 mg total) by mouth daily   ondansetron (ZOFRAN) 4 mg tablet   No No   Sig: Take 1 tablet (4 mg total) by mouth every 6 (six) hours      Facility-Administered Medications: None       Past Medical History:   Diagnosis Date    Asthma     Diabetes mellitus (HCC)     Polycystic ovarian disease        Past Surgical History:   Procedure Laterality Date    NO PAST SURGERIES      VT EXC B9 LESION MRGN XCP SK TG T/A/L 0.5 CM/< Left 9/30/2021    Procedure: EXCISION WIDE LESION BREAST;  Surgeon: Shannon Bee MD;  Location: [facility];  Service: General       Family History   Problem Relation Age of Onset    Hypertension Mother     Diabetes Mother     Asthma Mother     Arthritis Mother     Alcohol abuse Father     No Known Problems Sister     No Known  Problems Brother     No Known Problems Brother     No Known Problems Brother     No Known Problems Brother     No Known Problems Brother     No Known Problems Brother      I have reviewed and agree with the history as documented.    E-Cigarette/Vaping    E-Cigarette Use Never User      E-Cigarette/Vaping Substances    Nicotine No     THC No     CBD No     Flavoring No     Other No     Unknown No      Social History     Tobacco Use    Smoking status: Never    Smokeless tobacco: Never   Vaping Use    Vaping status: Never Used   Substance Use Topics    Alcohol use: Not Currently     Alcohol/week: 6.0 standard drinks of alcohol     Types: 6 Cans of beer per week     Comment: social    Drug use: Never       Review of Systems    Physical Exam  Physical Exam  Vitals and nursing note reviewed.   Constitutional:       Appearance: She is normal weight.   HENT:      Head: Normocephalic and atraumatic.   Eyes:      Conjunctiva/sclera: Conjunctivae normal.      Pupils: Pupils are equal, round, and reactive to light.   Cardiovascular:      Rate and Rhythm: Normal rate and regular rhythm.   Pulmonary:      Effort: Pulmonary effort is normal. No respiratory distress.   Abdominal:      General: Abdomen is flat. There is no distension.   Musculoskeletal:         General: No swelling or deformity.      Cervical back: Normal range of motion and neck supple.      Comments: Mild tenderness to the right ulnar styloid   Skin:     General: Skin is dry.      Coloration: Skin is not jaundiced.   Neurological:      General: No focal deficit present.      Mental Status: She is alert and oriented to person, place, and time.   Psychiatric:         Mood and Affect: Mood normal.         Thought Content: Thought content normal.         Vital Signs  ED Triage Vitals [12/19/23 2038]   Temperature Pulse Respirations Blood Pressure SpO2   97.8 °F (36.6 °C) 66 17 (!) 177/97 98 %      Temp Source Heart Rate Source Patient Position - Orthostatic VS BP  Location FiO2 (%)   Tympanic Monitor Lying Left arm --      Pain Score       4           Vitals:    12/19/23 2038   BP: (!) 177/97   Pulse: 66   Patient Position - Orthostatic VS: Lying         Visual Acuity      ED Medications  Medications - No data to display    Diagnostic Studies  Results Reviewed       None                   XR wrist 3+ views RIGHT   Final Result by Dimitri Thornton MD (12/20 0814)      No acute osseous abnormality.            Workstation performed: [id number]                    Procedures  Procedures         ED Course                               SBIRT 22yo+      Flowsheet Row Most Recent Value   Initial Alcohol Screen: US AUDIT-C     1. How often do you have a drink containing alcohol? 0 Filed at: 12/19/2023 2038   2. How many drinks containing alcohol do you have on a typical day you are drinking?  0 Filed at: 12/19/2023 2038   3a. Male UNDER 65: How often do you have five or more drinks on one occasion? 0 Filed at: 12/19/2023 2038   3b. FEMALE Any Age, or MALE 65+: How often do you have 4 or more drinks on one occassion? 0 Filed at: 12/19/2023 2038   Audit-C Score 0 Filed at: 12/19/2023 2038   FABIAN: How many times in the past year have you...    Used an illegal drug or used a prescription medication for non-medical reasons? Never Filed at: 12/19/2023 2038                      Medical Decision Making  28-year-old female with right-sided wrist pain after minor injury.  Mild tenderness of the right ulna distally without wrist tenderness, no Colles' tenderness, no scaphoid tenderness    X-ray ordered to rule out fracture of the ulnar styloid.  Viewed and interpreted independently by me, no acute disease appreciated.  Patient has a splint from home which is appropriate.  Orthopedic follow-up as needed.    Problems Addressed:  Wrist injury: acute illness or injury    Amount and/or Complexity of Data Reviewed  Radiology: ordered and independent interpretation performed. Decision-making details  documented in ED Course.             Disposition  Final diagnoses:   Wrist injury     Time reflects when diagnosis was documented in both MDM as applicable and the Disposition within this note       Time User Action Codes Description Comment    12/19/2023  9:04 PM Shola Bruno Add [S69.90XA] Wrist injury           ED Disposition       ED Disposition   Discharge    Condition   Stable    Date/Time   Tue Dec 19, 2023 2104    Comment   Reyna Orosco discharge to home/self care.                   Follow-up Information       Follow up With Specialties Details Why Contact Info    [facility] Occupational Medicine [place]  Schedule an appointment as soon as possible for a visit  As needed [address] [place] [address]  [phone]            Discharge Medication List as of 12/19/2023  9:07 PM        CONTINUE these medications which have NOT CHANGED    Details   acetaminophen (TYLENOL) 500 mg tablet Take 2 tablets (1,000 mg total) by mouth every 8 (eight) hours as needed for mild pain, Starting Thu 12/2/2021, OTC      albuterol (PROVENTIL HFA,VENTOLIN HFA) 90 mcg/act inhaler Inhale 2 puffs every 6 (six) hours as needed for wheezing, Historical Med      HYDROcodone-acetaminophen (NORCO) 5-325 mg per tablet Take 1 tablet by mouth every 6 (six) hours as needed for pain for up to 5 dosesMax Daily Amount: 4 tablets, Starting Thu 9/30/2021, Normal      metFORMIN (GLUCOPHAGE) 850 mg tablet Take 1 tablet (850 mg total) by mouth 2 (two) times a day with meals, Starting Tue 12/28/2021, Normal      norethindrone (MICRONOR) 0.35 MG tablet Take 1 tablet (0.35 mg total) by mouth daily, Starting Tue 12/28/2021, Normal      ondansetron (ZOFRAN) 4 mg tablet Take 1 tablet (4 mg total) by mouth every 6 (six) hours, Starting Mon 11/20/2023, Normal             No discharge procedures on file.    PDMP Review       None            ED Provider  Electronically Signed by             Shola Bruno DO  12/21/23  0010

## 2024-01-09 ENCOUNTER — APPOINTMENT (EMERGENCY)
Dept: RADIOLOGY | Facility: HOSPITAL | Age: 29
End: 2024-01-09
Payer: MEDICARE

## 2024-01-09 ENCOUNTER — HOSPITAL ENCOUNTER (EMERGENCY)
Facility: HOSPITAL | Age: 29
Discharge: HOME | End: 2024-01-09
Attending: EMERGENCY MEDICINE
Payer: MEDICARE

## 2024-01-09 VITALS
SYSTOLIC BLOOD PRESSURE: 142 MMHG | OXYGEN SATURATION: 100 % | TEMPERATURE: 97.8 F | DIASTOLIC BLOOD PRESSURE: 88 MMHG | RESPIRATION RATE: 22 BRPM | HEART RATE: 77 BPM

## 2024-01-09 DIAGNOSIS — J45.901 ASTHMA EXACERBATION: Primary | ICD-10-CM

## 2024-01-09 LAB
2HR DELTA HS TROPONIN: 0 NG/L
ALBUMIN SERPL BCP-MCNC: 4.1 G/DL (ref 3.5–5)
ALP SERPL-CCNC: 70 U/L (ref 34–104)
ALT SERPL W P-5'-P-CCNC: 21 U/L (ref 7–52)
ANION GAP SERPL CALCULATED.3IONS-SCNC: 8 MMOL/L
AST SERPL W P-5'-P-CCNC: 15 U/L (ref 13–39)
BASOPHILS # BLD AUTO: 0.03 THOUSANDS/ÂΜL (ref 0–0.1)
BASOPHILS NFR BLD AUTO: 1 % (ref 0–1)
BILIRUB SERPL-MCNC: 0.71 MG/DL (ref 0.2–1)
BUN SERPL-MCNC: 9 MG/DL (ref 5–25)
CALCIUM SERPL-MCNC: 9.2 MG/DL (ref 8.4–10.2)
CARDIAC TROPONIN I PNL SERPL HS: 3 NG/L
CARDIAC TROPONIN I PNL SERPL HS: 3 NG/L
CHLORIDE SERPL-SCNC: 101 MMOL/L (ref 96–108)
CO2 SERPL-SCNC: 26 MMOL/L (ref 21–32)
CREAT SERPL-MCNC: 0.57 MG/DL (ref 0.6–1.3)
EOSINOPHIL # BLD AUTO: 0.13 THOUSAND/ÂΜL (ref 0–0.61)
EOSINOPHIL NFR BLD AUTO: 3 % (ref 0–6)
ERYTHROCYTE [DISTWIDTH] IN BLOOD BY AUTOMATED COUNT: 14 % (ref 11.6–15.1)
FLUAV RNA RESP QL NAA+PROBE: NEGATIVE
FLUBV RNA RESP QL NAA+PROBE: NEGATIVE
GFR SERPL CREATININE-BSD FRML MDRD: 125 ML/MIN/1.73SQ M
GLUCOSE SERPL-MCNC: 123 MG/DL (ref 65–140)
HCT VFR BLD AUTO: 39.4 % (ref 34.8–46.1)
HGB BLD-MCNC: 12.1 G/DL (ref 11.5–15.4)
IMM GRANULOCYTES # BLD AUTO: 0.01 THOUSAND/UL (ref 0–0.2)
IMM GRANULOCYTES NFR BLD AUTO: 0 % (ref 0–2)
LYMPHOCYTES # BLD AUTO: 1.84 THOUSANDS/ÂΜL (ref 0.6–4.47)
LYMPHOCYTES NFR BLD AUTO: 35 % (ref 14–44)
MCH RBC QN AUTO: 25.2 PG (ref 26.8–34.3)
MCHC RBC AUTO-ENTMCNC: 30.7 G/DL (ref 31.4–37.4)
MCV RBC AUTO: 82 FL (ref 82–98)
MONOCYTES # BLD AUTO: 0.42 THOUSAND/ÂΜL (ref 0.17–1.22)
MONOCYTES NFR BLD AUTO: 8 % (ref 4–12)
NEUTROPHILS # BLD AUTO: 2.78 THOUSANDS/ÂΜL (ref 1.85–7.62)
NEUTS SEG NFR BLD AUTO: 53 % (ref 43–75)
NRBC BLD AUTO-RTO: 0 /100 WBCS
PLATELET # BLD AUTO: 264 THOUSANDS/UL (ref 149–390)
PMV BLD AUTO: 9.4 FL (ref 8.9–12.7)
POTASSIUM SERPL-SCNC: 4 MMOL/L (ref 3.5–5.3)
PROT SERPL-MCNC: 7.7 G/DL (ref 6.4–8.4)
RBC # BLD AUTO: 4.8 MILLION/UL (ref 3.81–5.12)
RSV RNA RESP QL NAA+PROBE: NEGATIVE
SARS-COV-2 RNA RESP QL NAA+PROBE: NEGATIVE
SODIUM SERPL-SCNC: 135 MMOL/L (ref 135–147)
WBC # BLD AUTO: 5.21 THOUSAND/UL (ref 4.31–10.16)

## 2024-01-09 PROCEDURE — 0241U HB NFCT DS VIR RESP RNA 4 TRGT: CPT

## 2024-01-09 PROCEDURE — 36415 COLL VENOUS BLD VENIPUNCTURE: CPT

## 2024-01-09 PROCEDURE — 80053 COMPREHEN METABOLIC PANEL: CPT | Performed by: EMERGENCY MEDICINE

## 2024-01-09 PROCEDURE — 93005 ELECTROCARDIOGRAM TRACING: CPT

## 2024-01-09 PROCEDURE — 84484 ASSAY OF TROPONIN QUANT: CPT | Performed by: EMERGENCY MEDICINE

## 2024-01-09 PROCEDURE — 99285 EMERGENCY DEPT VISIT HI MDM: CPT

## 2024-01-09 PROCEDURE — 71046 X-RAY EXAM CHEST 2 VIEWS: CPT

## 2024-01-09 PROCEDURE — 85025 COMPLETE CBC W/AUTO DIFF WBC: CPT | Performed by: EMERGENCY MEDICINE

## 2024-01-09 PROCEDURE — 94640 AIRWAY INHALATION TREATMENT: CPT

## 2024-01-09 RX ORDER — IPRATROPIUM BROMIDE AND ALBUTEROL SULFATE 2.5; .5 MG/3ML; MG/3ML
3 SOLUTION RESPIRATORY (INHALATION) ONCE
Status: COMPLETED | OUTPATIENT
Start: 2024-01-09 | End: 2024-01-09

## 2024-01-09 RX ORDER — PREDNISONE 20 MG/1
40 TABLET ORAL DAILY
Qty: 8 TABLET | Refills: 0 | Status: SHIPPED | OUTPATIENT
Start: 2024-01-09 | End: 2024-01-13

## 2024-01-09 RX ORDER — PREDNISONE 20 MG/1
40 TABLET ORAL DAILY
Qty: 8 TABLET | Refills: 0 | Status: SHIPPED | OUTPATIENT
Start: 2024-01-09 | End: 2024-01-09

## 2024-01-09 RX ORDER — ALBUTEROL SULFATE 90 UG/1
2 AEROSOL, METERED RESPIRATORY (INHALATION) ONCE
Status: COMPLETED | OUTPATIENT
Start: 2024-01-09 | End: 2024-01-09

## 2024-01-09 RX ORDER — PREDNISONE 20 MG/1
40 TABLET ORAL ONCE
Status: COMPLETED | OUTPATIENT
Start: 2024-01-09 | End: 2024-01-09

## 2024-01-09 RX ADMIN — PREDNISONE 40 MG: 20 TABLET ORAL at 22:15

## 2024-01-09 RX ADMIN — IPRATROPIUM BROMIDE AND ALBUTEROL SULFATE 3 ML: .5; 3 SOLUTION RESPIRATORY (INHALATION) at 20:48

## 2024-01-09 RX ADMIN — ALBUTEROL SULFATE 2 PUFF: 90 AEROSOL, METERED RESPIRATORY (INHALATION) at 22:16

## 2024-01-09 NOTE — Clinical Note
Reyna Orosco was seen and treated in our emergency department on 1/9/2024.                Diagnosis:     Clothier  .    She may return on this date:     Reyna was seen in the ED for evaluation please excuse her for her absence from work on the evening of January 9, 2024. Thank you.      If you have any questions or concerns, please don't hesitate to call.      Taurus Gonzalez PA-C    ______________________________           _______________          _______________  Hospital Representative                              Date                                Time

## 2024-01-10 LAB
ATRIAL RATE: 88 BPM
P AXIS: 40 DEGREES
PR INTERVAL: 148 MS
QRS AXIS: 39 DEGREES
QRSD INTERVAL: 86 MS
QT INTERVAL: 376 MS
QTC INTERVAL: 454 MS
T WAVE AXIS: 24 DEGREES
VENTRICULAR RATE: 88 BPM

## 2024-01-10 NOTE — DISCHARGE INSTRUCTIONS
Today I provided prescription for prednisone.  Take as directed for asthma exacerbation.  Use the provided albuterol inhaler as needed.  Follow-up with primary care provider to have conversation about long-term asthma medication.  Return to ED for new or worsening symptoms.    Call [employer] at  [phone] [facility] ([phone]) to obtain a primary care physician.  They will be able to schedule you with a physician who sees patients with your insurance and physicians who see patients without insurance.

## 2024-01-10 NOTE — ED PROVIDER NOTES
History  Chief Complaint   Patient presents with    Shortness of Breath     Pt woke up around 3pm today c/o shortness of breath and chest pain. Pt denies cough.      28-year-old female with history of asthma, type 2 diabetes, polycystic ovarian disease presents to ED for evaluation of shortness of breath, chest tightness.  Patient is accompanied by significant other who provides history.  Patient woke up around 3 PM with symptoms.  Her significant other noticed that she appears off compared to her normal.  Significant other concerned and brought patient to ED.  Patient denying cough, fever, chills, myalgia, dysuria, increased urinary frequency, diarrhea, constipation, nausea, vomiting.  Symptoms mainly shortness of breath, chest pain.  Denies history of PE, DVT.  Denies exogenous estrogen use.  Denies recent long plane ride, car ride.  Denies recent cancer treatment and/or surgery.  Has history of asthma.  Does not have a albuterol inhaler currently.  Current symptoms consistent with patient's previous asthma exacerbations.             Prior to Admission Medications   Prescriptions Last Dose Informant Patient Reported? Taking?   HYDROcodone-acetaminophen (NORCO) 5-325 mg per tablet   No No   Sig: Take 1 tablet by mouth every 6 (six) hours as needed for pain for up to 5 dosesMax Daily Amount: 4 tablets   Patient not taking: Reported on 9/30/2021   acetaminophen (TYLENOL) 500 mg tablet   No No   Sig: Take 2 tablets (1,000 mg total) by mouth every 8 (eight) hours as needed for mild pain   albuterol (PROVENTIL HFA,VENTOLIN HFA) 90 mcg/act inhaler   Yes No   Sig: Inhale 2 puffs every 6 (six) hours as needed for wheezing   metFORMIN (GLUCOPHAGE) 850 mg tablet   No No   Sig: Take 1 tablet (850 mg total) by mouth 2 (two) times a day with meals   norethindrone (MICRONOR) 0.35 MG tablet   No No   Sig: Take 1 tablet (0.35 mg total) by mouth daily   ondansetron (ZOFRAN) 4 mg tablet   No No   Sig: Take 1 tablet (4 mg total) by  mouth every 6 (six) hours      Facility-Administered Medications: None       Past Medical History:   Diagnosis Date    Asthma     Diabetes mellitus (HCC)     Polycystic ovarian disease        Past Surgical History:   Procedure Laterality Date    NO PAST SURGERIES      ID EXC B9 LESION MRGN XCP SK TG T/A/L 0.5 CM/< Left 9/30/2021    Procedure: EXCISION WIDE LESION BREAST;  Surgeon: Shannon Bee MD;  Location: [facility] OR;  Service: General       Family History   Problem Relation Age of Onset    Hypertension Mother     Diabetes Mother     Asthma Mother     Arthritis Mother     Alcohol abuse Father     No Known Problems Sister     No Known Problems Brother     No Known Problems Brother     No Known Problems Brother     No Known Problems Brother     No Known Problems Brother     No Known Problems Brother      I have reviewed and agree with the history as documented.    E-Cigarette/Vaping    E-Cigarette Use Never User      E-Cigarette/Vaping Substances    Nicotine No     THC No     CBD No     Flavoring No     Other No     Unknown No      Social History     Tobacco Use    Smoking status: Never    Smokeless tobacco: Never   Vaping Use    Vaping status: Never Used   Substance Use Topics    Alcohol use: Not Currently     Alcohol/week: 6.0 standard drinks of alcohol     Types: 6 Cans of beer per week     Comment: social    Drug use: Never       Review of Systems   Constitutional:  Negative for chills, diaphoresis, fatigue and fever.   HENT:  Negative for congestion, dental problem, drooling, ear discharge, ear pain, sore throat, trouble swallowing and voice change.    Eyes:  Negative for pain and visual disturbance.   Respiratory:  Positive for chest tightness and shortness of breath. Negative for cough, wheezing and stridor.    Cardiovascular:  Negative for chest pain and palpitations.   Gastrointestinal:  Negative for abdominal pain and vomiting.   Genitourinary:  Negative for dysuria and hematuria.   Musculoskeletal:   Negative for arthralgias, back pain and myalgias.   Skin:  Negative for color change and rash.   Neurological:  Negative for seizures and syncope.   All other systems reviewed and are negative.      Physical Exam  Physical Exam  Vitals and nursing note reviewed.   Constitutional:       General: She is not in acute distress.     Appearance: She is well-developed. She is not toxic-appearing or diaphoretic.   HENT:      Head: Normocephalic and atraumatic.      Mouth/Throat:      Mouth: Mucous membranes are moist.      Pharynx: Oropharynx is clear.   Eyes:      Extraocular Movements: Extraocular movements intact.      Conjunctiva/sclera: Conjunctivae normal.      Pupils: Pupils are equal, round, and reactive to light.   Cardiovascular:      Rate and Rhythm: Normal rate and regular rhythm.      Pulses: Normal pulses. No decreased pulses.      Heart sounds: Normal heart sounds. No murmur heard.     No friction rub. No gallop.   Pulmonary:      Effort: Pulmonary effort is normal. No tachypnea, accessory muscle usage or respiratory distress.      Breath sounds: No stridor. Wheezing present. No decreased breath sounds, rhonchi or rales.   Abdominal:      Palpations: Abdomen is soft.      Tenderness: There is no abdominal tenderness.   Musculoskeletal:         General: No swelling.      Cervical back: Neck supple.   Skin:     General: Skin is warm and dry.      Capillary Refill: Capillary refill takes less than 2 seconds.   Neurological:      Mental Status: She is alert.   Psychiatric:         Mood and Affect: Mood normal.         Vital Signs  ED Triage Vitals [01/09/24 1852]   Temperature Pulse Respirations Blood Pressure SpO2   97.8 °F (36.6 °C) 77 22 142/88 100 %      Temp Source Heart Rate Source Patient Position - Orthostatic VS BP Location FiO2 (%)   Oral Monitor Sitting Right arm --      Pain Score       --           Vitals:    01/09/24 1852   BP: 142/88   Pulse: 77   Patient Position - Orthostatic VS: Sitting          Visual Acuity      ED Medications  Medications   ipratropium-albuterol (DUO-NEB) 0.5-2.5 mg/3 mL inhalation solution 3 mL (3 mL Nebulization Given 1/9/24 2048)   albuterol (PROVENTIL HFA,VENTOLIN HFA) inhaler 2 puff (2 puffs Inhalation Given 1/9/24 2216)   predniSONE tablet 40 mg (40 mg Oral Given 1/9/24 2215)       Diagnostic Studies  Results Reviewed       Procedure Component Value Units Date/Time    FLU/RSV/COVID - if FLU/RSV clinically relevant [[id number]]  (Normal) Collected: 01/09/24 2103    Lab Status: Final result Specimen: Nares from Nose Updated: 01/09/24 2149     SARS-CoV-2 Negative     INFLUENZA A PCR Negative     INFLUENZA B PCR Negative     RSV PCR Negative    Narrative:      FOR PEDIATRIC PATIENTS - copy/paste COVID Guidelines URL to browser: [email or web address]    SARS-CoV-2 assay is a Nucleic Acid Amplification assay intended for the  qualitative detection of nucleic acid from SARS-CoV-2 in nasopharyngeal  swabs. Results are for the presumptive identification of SARS-CoV-2 RNA.    Positive results are indicative of infection with SARS-CoV-2, the virus  causing COVID-19, but do not rule out bacterial infection or co-infection  with other viruses. Laboratories within the [place] and its  territories are required to report all positive results to the appropriate  public health authorities. Negative results do not preclude SARS-CoV-2  infection and should not be used as the sole basis for treatment or other  patient management decisions. Negative results must be combined with  clinical observations, patient history, and epidemiological information.  This test has not been FDA cleared or approved.    This test has been authorized by FDA under an Emergency Use Authorization  (EUA). This test is only authorized for the duration of time the  declaration that circumstances exist justifying the authorization of the  emergency use of an in  vitro diagnostic tests for detection of SARS-CoV-2  virus and/or diagnosis of COVID-19 infection under section 564(b)(1) of  the Act, 21 U.S.C. 360bbb-3(b)(1), unless the authorization is terminated  or revoked sooner. The test has been validated but independent review by FDA  and CLIA is pending.    Test performed using [employer] GeneXpert: This RT-PCR assay targets N2,  a region unique to SARS-CoV-2. A conserved region in the E-gene was chosen  for pan-Sarbecovirus detection which includes SARS-CoV-2.    According to CMS-2020-01-R, this platform meets the definition of high-throughput technology.    HS Troponin I 2hr [[id number]]  (Normal) Collected: 01/09/24 2103    Lab Status: Final result Specimen: Blood from Arm, Right Updated: 01/09/24 2140     hs TnI 2hr 3 ng/L      Delta 2hr hsTnI 0 ng/L     HS Troponin 0hr (reflex protocol) [[id number]]  (Normal) Collected: 01/09/24 1859    Lab Status: Final result Specimen: Blood from Arm, Right Updated: 01/09/24 1939     hs TnI 0hr 3 ng/L     Comprehensive metabolic panel [[id number]]  (Abnormal) Collected: 01/09/24 1859    Lab Status: Final result Specimen: Blood from Arm, Right Updated: 01/09/24 1933     Sodium 135 mmol/L      Potassium 4.0 mmol/L      Chloride 101 mmol/L      CO2 26 mmol/L      ANION GAP 8 mmol/L      BUN 9 mg/dL      Creatinine 0.57 mg/dL      Glucose 123 mg/dL      Calcium 9.2 mg/dL      AST 15 U/L      ALT 21 U/L      Alkaline Phosphatase 70 U/L      Total Protein 7.7 g/dL      Albumin 4.1 g/dL      Total Bilirubin 0.71 mg/dL      eGFR 125 ml/min/1.73sq m     Narrative:      National Kidney Disease Foundation guidelines for Chronic Kidney Disease (CKD):     Stage 1 with normal or high GFR (GFR > 90 mL/min/1.73 square meters)    Stage 2 Mild CKD (GFR = 60-89 mL/min/1.73 square meters)    Stage 3A Moderate CKD (GFR = 45-59 mL/min/1.73 square meters)    Stage 3B Moderate CKD (GFR = 30-44 mL/min/1.73 square meters)    Stage 4 Severe CKD (GFR = 15-29  mL/min/1.73 square meters)    Stage 5 End Stage CKD (GFR <15 mL/min/1.73 square meters)  Note: GFR calculation is accurate only with a steady state creatinine    CBC and differential [[id number]]  (Abnormal) Collected: 01/09/24 1859    Lab Status: Final result Specimen: Blood from Arm, Right Updated: 01/09/24 1918     WBC 5.21 Thousand/uL      RBC 4.80 Million/uL      Hemoglobin 12.1 g/dL      Hematocrit 39.4 %      MCV 82 fL      MCH 25.2 pg      MCHC 30.7 g/dL      RDW 14.0 %      MPV 9.4 fL      Platelets 264 Thousands/uL      nRBC 0 /100 WBCs      Neutrophils Relative 53 %      Immat GRANS % 0 %      Lymphocytes Relative 35 %      Monocytes Relative 8 %      Eosinophils Relative 3 %      Basophils Relative 1 %      Neutrophils Absolute 2.78 Thousands/µL      Immature Grans Absolute 0.01 Thousand/uL      Lymphocytes Absolute 1.84 Thousands/µL      Monocytes Absolute 0.42 Thousand/µL      Eosinophils Absolute 0.13 Thousand/µL      Basophils Absolute 0.03 Thousands/µL                    XR chest 2 views   ED Interpretation by Taurus Gonzalez PA-C (01/10 0144)   No pneumonia, pneumothorax, tension pneumothorax                 Procedures  ECG 12 Lead Documentation Only    Date/Time: 1/9/2024 6:56 PM    Performed by: Taurus Gonzalez PA-C  Authorized by: Taurus Gonzalez PA-C    Patient location:  ED  Previous ECG:     Previous ECG:  Compared to current    Similarity:  No change  Interpretation:     Interpretation: normal    Rate:     ECG rate:  88    ECG rate assessment: normal    Rhythm:     Rhythm: sinus rhythm    Ectopy:     Ectopy: none    QRS:     QRS axis:  Normal    QRS intervals:  Normal  Conduction:     Conduction: normal    ST segments:     ST segments:  Normal  T waves:     T waves: normal             ED Course                                   Wells' Criteria for PE      Flowsheet Row Most Recent Value   Wells' Criteria for PE    Clinical signs and symptoms of DVT 0 Filed at: 01/09/2024  2030   PE is primary diagnosis or equally likely 0 Filed at: 01/09/2024 2030   HR >100 0 Filed at: 01/09/2024 2030   Immobilization at least 3 days or Surgery in the previous 4 weeks 0 Filed at: 01/09/2024 2030   Previous, objectively diagnosed PE or DVT 0 Filed at: 01/09/2024 2030   Hemoptysis 0 Filed at: 01/09/2024 2030   Malignancy with treatment within 6 months or palliative 0 Filed at: 01/09/2024 2030   Wells' Criteria Total 0 Filed at: 01/09/2024 2030                  Medical Decision Making  28-year-old female presents to ED for evaluation as above.  Around 3 PM began experiencing chest tightness, shortness of breath.  Has history of asthma.  Not currently on chronic asthma treatment.  On physical examination patient is afebrile, nontachycardic, normotensive.  No murmur.  Wheezing heard bilaterally.  Patient alert and answering questions as able.  Patient is Vietnamese-speaking and her significant other was used for translation.  Denying URI symptoms such as cough, sore throat, nasal congestion.  No known sick contacts.  Will obtain workup consisting of EKG, CBC, CMP, serial troponin, flu/RSV/COVID, chest x-ray.  Providing patient with DuoNeb treatment in ED for symptomatic relief.  Will reevaluate.  Differential diagnosis includes but not limited to asthma exacerbation, MI, ACS, pneumothorax, pneumonia, tension pneumothorax, COVID, RSV, flu     CBC returned without leukocytosis.  CMP without concerning values.  Troponin of 3 ng/L at 0-hour, 3 ng/L at 2 hours.  Chest x-ray without evidence of acute cardiopulmonary disease.  Flu/RSV/COVID swab negative.  EKG without evidence of acute cardiac injury, arrhythmia.     Reevaluated patient.  She is feeling much better after DuoNeb treatment.  Based on patient's presentation, results of workup, reaction to DuoNeb treatment: Suspect she is experiencing a asthma exacerbation.  Will provide treatment with albuterol inhaler, prednisone burst.  First dose given in ED.   Prescription sent to patient's preferred pharmacy.  Providing patient with contact information for the [facility] service in order for patient to obtain routine primary care visits. She currently does not have a PCP.  Feel this would benefit patient to obtain chronic asthma treatment.  Patient in agreement with plan.  Patient discharged.  Advised to return to ED for new or worsening symptoms.     Prior to discharge, discharge instructions were discussed with patient at bedside. Patient was provided both verbal and written instructions. Patient is understanding of the discharge instructions and is agreeable to plan of care. Return precautions were discussed with patient bedside, patient verbalized understanding of signs and symptoms that would necessitate return to the ED. All questions were answered. Patient was comfortable with the plan of care and discharged to home.     Amount and/or Complexity of Data Reviewed  Labs: ordered.  Radiology: ordered.    Risk  Prescription drug management.             Disposition  Final diagnoses:   Asthma exacerbation     Time reflects when diagnosis was documented in both MDM as applicable and the Disposition within this note       Time User Action Codes Description Comment    1/9/2024 10:00 PM Taurus Gonzalez Add [J45.901] Asthma exacerbation           ED Disposition       ED Disposition   Discharge    Condition   Stable    Date/Time   Tue Jan 9, 2024 2159    Comment   Reyna Orosco discharge to home/self care.                   Follow-up Information       Follow up With Specialties Details Why Contact Info    Jcarlos Hummel MD Family Medicine   [address] [place] [address]      [employer]    [phone]              Discharge Medication List as of 1/9/2024 10:02 PM        START taking these medications    Details   predniSONE 20 mg tablet Take 2 tablets (40 mg total) by mouth daily for 4 days, Starting Tue 1/9/2024, Until Sat 1/13/2024, Normal            CONTINUE these medications which have NOT CHANGED    Details   acetaminophen (TYLENOL) 500 mg tablet Take 2 tablets (1,000 mg total) by mouth every 8 (eight) hours as needed for mild pain, Starting Thu 12/2/2021, OTC      albuterol (PROVENTIL HFA,VENTOLIN HFA) 90 mcg/act inhaler Inhale 2 puffs every 6 (six) hours as needed for wheezing, Historical Med      HYDROcodone-acetaminophen (NORCO) 5-325 mg per tablet Take 1 tablet by mouth every 6 (six) hours as needed for pain for up to 5 dosesMax Daily Amount: 4 tablets, Starting Thu 9/30/2021, Normal      metFORMIN (GLUCOPHAGE) 850 mg tablet Take 1 tablet (850 mg total) by mouth 2 (two) times a day with meals, Starting Tue 12/28/2021, Normal      norethindrone (MICRONOR) 0.35 MG tablet Take 1 tablet (0.35 mg total) by mouth daily, Starting Tue 12/28/2021, Normal      ondansetron (ZOFRAN) 4 mg tablet Take 1 tablet (4 mg total) by mouth every 6 (six) hours, Starting Mon 11/20/2023, Normal             No discharge procedures on file.    PDMP Review       None            ED Provider  Electronically Signed by             Taurus Gonzalez PA-C  01/10/24 9010

## 2024-04-16 ENCOUNTER — OFFICE VISIT (OUTPATIENT)
Dept: OBGYN CLINIC | Facility: CLINIC | Age: 29
End: 2024-04-16
Payer: MEDICARE

## 2024-04-16 VITALS
BODY MASS INDEX: 53.92 KG/M2 | WEIGHT: 293 LBS | SYSTOLIC BLOOD PRESSURE: 148 MMHG | HEIGHT: 62 IN | DIASTOLIC BLOOD PRESSURE: 80 MMHG

## 2024-04-16 DIAGNOSIS — D25.9 ABNORMAL UTERINE BLEEDING DUE TO LEIOMYOMA OF UTERUS: ICD-10-CM

## 2024-04-16 DIAGNOSIS — N93.9 ABNORMAL UTERINE BLEEDING DUE TO LEIOMYOMA OF UTERUS: ICD-10-CM

## 2024-04-16 DIAGNOSIS — D25.2 SUBSEROSAL LEIOMYOMA OF UTERUS: Primary | ICD-10-CM

## 2024-04-16 DIAGNOSIS — E28.2 PCOS (POLYCYSTIC OVARIAN SYNDROME): ICD-10-CM

## 2024-04-16 PROCEDURE — 99213 OFFICE O/P EST LOW 20 MIN: CPT | Performed by: STUDENT IN AN ORGANIZED HEALTH CARE EDUCATION/TRAINING PROGRAM

## 2024-04-16 RX ORDER — METFORMIN HYDROCHLORIDE 500 MG/1
1000 TABLET, EXTENDED RELEASE ORAL
Qty: 180 TABLET | Refills: 3 | Status: SHIPPED | OUTPATIENT
Start: 2024-04-16 | End: 2025-04-16

## 2024-04-17 PROBLEM — D25.2 SUBSEROSAL LEIOMYOMA OF UTERUS: Status: ACTIVE | Noted: 2024-04-17

## 2024-04-17 PROBLEM — E28.2 PCOS (POLYCYSTIC OVARIAN SYNDROME): Status: ACTIVE | Noted: 2024-04-17

## 2024-04-17 RX ORDER — RELUGOLIX, ESTRADIOL HEMIHYDRATE, AND NORETHINDRONE ACETATE 40; 1; .5 MG/1; MG/1; MG/1
TABLET, FILM COATED ORAL
Status: CANCELLED | OUTPATIENT
Start: 2024-04-17

## 2024-04-17 NOTE — ASSESSMENT & PLAN NOTE
- We reviewed her CT imaging in detail which suggests an 8 cm fundal subserosal fibroid.  Reyna reports some bulk symptoms and heavy periods.  She does not desire surgical management.  - We discussed the treatment for uterine leiomyoma including observation, medical therapy, and surgical therapy.  - With regard to observation, we discussed that patients with large asymptomatic myomatous uterus in whom dimensions have not increased can be observed with annual pelvic exam.    - I advised pursuing weight loss prior to surgical intervention to reduce surgical risk.  - Medical menstrual suppression or GnRH agonist/antagonist recommended.  - With regard to medical management, we discussed nonhormonal therapies such as Lysteda and NSAIDS, hormonal therapies including lngIUD, and GnRH analogues such as Lupron and Elagolix.  We discussed the risks, benefits, efficacy, and side effects of each approach. For Lupron therapy, we discussed that when used alone (ie, without hormonal add-back treatment), cause significant hypoestrogenic side effects, including hot flashes and bone loss.  - With regard to surgical management, we discussed myomectomy for patients who desire future fertility and hysterectomy for women who have completed child-bearing.  We discussed the risks and benefits of surgical treatment. For myomectomy, we discussed the risks of substantial blood loss, ileus, and pain.  We discussed that the recurrence risk of leiomyomas is approximately 30%.  For hysterectomy, we discussed that it can be performed vaginally, abdominally, or laparoscopically with or without robotic assistance and that the method is dictated by the size of the uterus and fibroids.   - We also discussed the risks and benefits of uterine artery embolization, and discussed that there is an approximate 25 percent risk of subsequently undergoing hysterectomy after UAE.

## 2024-04-17 NOTE — PROGRESS NOTES
[facility] [address], [place], [place]    Assessment/Plan:  Reyna Orosco is a 28 y.o.  female who presents to discuss management of PCOS and 8 cm subserosal fibroid.      Subserosal leiomyoma of uterus  - We reviewed her CT imaging in detail which suggests an 8 cm fundal subserosal fibroid.  Reyna reports some bulk symptoms and heavy periods.  She does not desire surgical management.  - We discussed the treatment for uterine leiomyoma including observation, medical therapy, and surgical therapy.  - With regard to observation, we discussed that patients with large asymptomatic myomatous uterus in whom dimensions have not increased can be observed with annual pelvic exam.    - I advised pursuing weight loss prior to surgical intervention to reduce surgical risk.  - Medical menstrual suppression or GnRH agonist/antagonist recommended.  - With regard to medical management, we discussed nonhormonal therapies such as Lysteda and NSAIDS, hormonal therapies including lngIUD, and GnRH analogues such as Lupron and Elagolix.  We discussed the risks, benefits, efficacy, and side effects of each approach. For Lupron therapy, we discussed that when used alone (ie, without hormonal add-back treatment), cause significant hypoestrogenic side effects, including hot flashes and bone loss.  - With regard to surgical management, we discussed myomectomy for patients who desire future fertility and hysterectomy for women who have completed child-bearing.  We discussed the risks and benefits of surgical treatment. For myomectomy, we discussed the risks of substantial blood loss, ileus, and pain.  We discussed that the recurrence risk of leiomyomas is approximately 30%.  For hysterectomy, we discussed that it can be performed vaginally, abdominally, or laparoscopically with or without robotic assistance and that the method is dictated by the size of the uterus and fibroids.   - We also  "discussed the risks and benefits of uterine artery embolization, and discussed that there is an approximate 25 percent risk of subsequently undergoing hysterectomy after UAE.     Diagnoses and all orders for this visit:    Subserosal leiomyoma of uterus    PCOS (polycystic ovarian syndrome)  -     metFORMIN (GLUCOPHAGE-XR) 500 mg 24 hr tablet; Take 2 tablets (1,000 mg total) by mouth daily with dinner    Abnormal uterine bleeding due to leiomyoma of uterus  -     norethindrone (Aygestin) 5 mg tablet; Take 1 tablet (5 mg total) by mouth daily          Subjective:   Reyna Orosco is a 28 y.o.  female.  CC: fibroid    HPI: Reyna presents for management of fibroid uterus.  She reports bulk symptoms and heavy periods.        ROS: Review of Systems   Constitutional:  Negative for chills and fever.   Respiratory:  Negative for cough and shortness of breath.    Cardiovascular:  Negative for chest pain and leg swelling.   Gastrointestinal:  Negative for abdominal pain, nausea and vomiting.   Genitourinary:  Negative for dysuria, frequency and urgency.   Neurological:  Negative for dizziness, light-headedness and headaches.       PFSH: The following portions of the patient's history were reviewed and updated as appropriate: allergies, current medications, past family history, past medical history, obstetric history, gynecologic history, past social history, past surgical history and problem list.       Objective:  /80   Ht 5' 2\" (1.575 m)   Wt 136 kg (300 lb)   BMI 54.87 kg/m²    Physical Exam  Constitutional:       Appearance: Normal appearance.   Cardiovascular:      Rate and Rhythm: Normal rate.   Pulmonary:      Effort: Pulmonary effort is normal.   Neurological:      Mental Status: She is alert.   Psychiatric:         Mood and Affect: Mood normal.         Behavior: Behavior normal.           Evonne Wyman MD  [facility]  [facility]  2024 11:49 PM    "

## 2024-04-19 ENCOUNTER — OFFICE VISIT (OUTPATIENT)
Dept: FAMILY MEDICINE CLINIC | Facility: CLINIC | Age: 29
End: 2024-04-19
Payer: MEDICARE

## 2024-04-19 VITALS
TEMPERATURE: 97.9 F | RESPIRATION RATE: 18 BRPM | HEART RATE: 75 BPM | SYSTOLIC BLOOD PRESSURE: 136 MMHG | OXYGEN SATURATION: 99 % | DIASTOLIC BLOOD PRESSURE: 78 MMHG | BODY MASS INDEX: 53.92 KG/M2 | WEIGHT: 293 LBS | HEIGHT: 62 IN

## 2024-04-19 DIAGNOSIS — Z76.89 ENCOUNTER TO ESTABLISH CARE: ICD-10-CM

## 2024-04-19 DIAGNOSIS — R73.03 PREDIABETES: ICD-10-CM

## 2024-04-19 DIAGNOSIS — J45.20 MILD INTERMITTENT ASTHMA WITHOUT COMPLICATION: ICD-10-CM

## 2024-04-19 DIAGNOSIS — Z87.898 HISTORY OF BREAST LUMP: ICD-10-CM

## 2024-04-19 DIAGNOSIS — L73.2 HIDRADENITIS SUPPURATIVA: Primary | ICD-10-CM

## 2024-04-19 DIAGNOSIS — E28.2 PCOS (POLYCYSTIC OVARIAN SYNDROME): ICD-10-CM

## 2024-04-19 DIAGNOSIS — D50.9 MICROCYTIC ANEMIA: ICD-10-CM

## 2024-04-19 DIAGNOSIS — R73.01 IFG (IMPAIRED FASTING GLUCOSE): ICD-10-CM

## 2024-04-19 PROCEDURE — 99214 OFFICE O/P EST MOD 30 MIN: CPT | Performed by: FAMILY MEDICINE

## 2024-04-19 RX ORDER — DOXYCYCLINE HYCLATE 100 MG/1
100 CAPSULE ORAL EVERY 12 HOURS SCHEDULED
Qty: 14 CAPSULE | Refills: 0 | Status: SHIPPED | OUTPATIENT
Start: 2024-04-19 | End: 2024-04-26

## 2024-04-19 NOTE — PROGRESS NOTES
Reyna Orosco 1995 female MRN: [id number]  [facility] - [place]    Visit to Establish Care: Family Medicine    Assessment/Plan     No problem-specific Assessment & Plan notes found for this encounter.    Reyna was seen today for establish care.    Diagnoses and all orders for this visit:    Hidradenitis suppurativa  -     doxycycline hyclate (VIBRAMYCIN) 100 mg capsule; Take 1 capsule (100 mg total) by mouth every 12 (twelve) hours for 7 days  -     Ambulatory Referral to Dermatology; Future  -     Ambulatory Referral to General Surgery; Future    History of breast lump  -     doxycycline hyclate (VIBRAMYCIN) 100 mg capsule; Take 1 capsule (100 mg total) by mouth every 12 (twelve) hours for 7 days  -     Ambulatory Referral to General Surgery; Future    PCOS (polycystic ovarian syndrome)  -     doxycycline hyclate (VIBRAMYCIN) 100 mg capsule; Take 1 capsule (100 mg total) by mouth every 12 (twelve) hours for 7 days  -     TSH, 3rd generation with Free T4 reflex; Future  -     Lipid Panel with Direct LDL reflex; Future    IFG (impaired fasting glucose)  -     CBC and differential; Future  -     Comprehensive metabolic panel; Future  -     Lipid Panel with Direct LDL reflex; Future    Prediabetes  -     Lipid Panel with Direct LDL reflex; Future  -     Hemoglobin A1C; Future    BMI 50.0-59.9, adult (HCC)  -     Ambulatory Referral to Weight Management; Future    Mild intermittent asthma without complication  -     TSH, 3rd generation with Free T4 reflex; Future  -     Lipid Panel with Direct LDL reflex; Future    Microcytic anemia  -     Iron Panel (Includes Ferritin, Iron Sat%, Iron, and TIBC); Future    Encounter to establish care        In addition to the above, the patient was counseled on general preventative health care subjects, including but not limited to:  - Nutrition, healthy weight, aerobic and weight-bearing exercise.  - Mental health, social support, and self care.  - Advised of  "the importance of dental hygiene and routine dental visits.  - Patient made aware of  services at the office.      Updated blood work ordered, dermatology and breat surgery referral. Doxycyline provided to start with HS flare.   Referral weight management.   Follow-up 2-3 months.     SUBJECTIVE    HPI:  Reyna Orosco is a 28 y.o. female who presented to establish care with this family medicine practice.     HS- Prior had surgery 2021 for left breast lump/HS flare, since then it has grown back, occasionally will drain pus, no drainage or redness/warmth now. Prior biopsy-    \"Hidradenitis, Left medial breast hidradenitis, excision:  - Inflamed scar with acute and chronically inflamed granulation tissue, consistent with hidradenitis     suppurativa (acne inversa).  - Negative for malignancy.   \"    PCOS- Recently started on metformin by OBGYN.    BMI 55- Has struggled with weight most of life, prior considered weight loss surgery.     Asthma- Since childhood, rare albuterol use.     Family history- Mother high blood pressure, diabetes, arthritis, asthma; sister has high blood pressure    Review of Systems   All other systems reviewed and are negative.      Historical Information   Past Medical History:   Diagnosis Date    Asthma     Diabetes mellitus (HCC)     Polycystic ovarian disease      Past Surgical History:   Procedure Laterality Date    NO PAST SURGERIES      ID EXC B9 LESION MRGN XCP SK TG T/A/L 0.5 CM/< Left 9/30/2021    Procedure: EXCISION WIDE LESION BREAST;  Surgeon: Shannon Bee MD;  Location: [facility] OR;  Service: General     Family History   Problem Relation Age of Onset    Hypertension Mother     Diabetes Mother     Asthma Mother     Arthritis Mother     Alcohol abuse Father     No Known Problems Sister     No Known Problems Brother     No Known Problems Brother     No Known Problems Brother     No Known Problems Brother     No Known Problems Brother     No Known Problems Brother  "     Social History     Socioeconomic History    Marital status: Single     Spouse name: Not on file    Number of children: Not on file    Years of education: Not on file    Highest education level: Not on file   Occupational History    Not on file   Tobacco Use    Smoking status: Never    Smokeless tobacco: Never   Vaping Use    Vaping status: Never Used   Substance and Sexual Activity    Alcohol use: Not Currently     Alcohol/week: 6.0 standard drinks of alcohol     Types: 6 Cans of beer per week     Comment: social    Drug use: Never    Sexual activity: Not Currently     Partners: Female     Birth control/protection: None   Other Topics Concern    Not on file   Social History Narrative    Not on file     Social Determinants of Health     Financial Resource Strain: Not on file   Food Insecurity: Not on file   Transportation Needs: Not on file   Physical Activity: Not on file   Stress: Not on file   Social Connections: Not on file   Intimate Partner Violence: Not on file   Housing Stability: Not on file     OB History          0    Para   0    Term   0       0    AB   0    Living   0         SAB   0    IAB   0    Ectopic   0    Multiple   0    Live Births   0                   Medications:      Current Outpatient Medications:     albuterol (PROVENTIL HFA,VENTOLIN HFA) 90 mcg/act inhaler, Inhale 2 puffs every 6 (six) hours as needed for wheezing, Disp: , Rfl:     doxycycline hyclate (VIBRAMYCIN) 100 mg capsule, Take 1 capsule (100 mg total) by mouth every 12 (twelve) hours for 7 days, Disp: 14 capsule, Rfl: 0    metFORMIN (GLUCOPHAGE-XR) 500 mg 24 hr tablet, Take 2 tablets (1,000 mg total) by mouth daily with dinner, Disp: 180 tablet, Rfl: 3      Physical Exam:    Physical Exam  Vitals reviewed.   Constitutional:       General: She is not in acute distress.     Appearance: Normal appearance. She is not ill-appearing, toxic-appearing or diaphoretic.   HENT:      Head: Normocephalic and atraumatic.    Eyes:      General:         Right eye: No discharge.         Left eye: No discharge.      Extraocular Movements: Extraocular movements intact.      Conjunctiva/sclera: Conjunctivae normal.   Cardiovascular:      Rate and Rhythm: Normal rate and regular rhythm.      Heart sounds: Normal heart sounds. No murmur heard.     No friction rub. No gallop.   Pulmonary:      Effort: Pulmonary effort is normal. No respiratory distress.      Breath sounds: Normal breath sounds. No stridor. No wheezing or rhonchi.   Musculoskeletal:         General: No swelling, tenderness or signs of injury.      Right lower leg: No edema.      Left lower leg: No edema.   Skin:     General: Skin is warm.      Coloration: Skin is not pale.      Findings: Lesion present. No erythema or rash.      Comments: Keloid scarring/mass left inner breast, no discharge or warmth today. Scattered keloids.   Neurological:      Mental Status: She is alert and oriented to person, place, and time.      Motor: No weakness.   Psychiatric:         Mood and Affect: Mood normal.         Behavior: Behavior normal.            Future Appointments   Date Time Provider Department Center   6/20/2024  8:00 AM Kaylynn Doherty DO [facility]   12/16/2024  7:15 AM Evonne Wyman MD OBGYN [facility]         Kaylynn Doherty DO  [facility]

## 2024-06-08 ENCOUNTER — APPOINTMENT (OUTPATIENT)
Dept: LAB | Facility: HOSPITAL | Age: 29
End: 2024-06-08
Payer: MEDICARE

## 2024-06-08 DIAGNOSIS — R73.03 PREDIABETES: ICD-10-CM

## 2024-06-08 DIAGNOSIS — R73.01 IFG (IMPAIRED FASTING GLUCOSE): ICD-10-CM

## 2024-06-08 DIAGNOSIS — E28.2 PCOS (POLYCYSTIC OVARIAN SYNDROME): ICD-10-CM

## 2024-06-08 DIAGNOSIS — D50.9 MICROCYTIC ANEMIA: ICD-10-CM

## 2024-06-08 DIAGNOSIS — J45.20 MILD INTERMITTENT ASTHMA WITHOUT COMPLICATION: ICD-10-CM

## 2024-06-08 LAB
ALBUMIN SERPL BCP-MCNC: 4.1 G/DL (ref 3.5–5)
ALP SERPL-CCNC: 59 U/L (ref 34–104)
ALT SERPL W P-5'-P-CCNC: 34 U/L (ref 7–52)
ANION GAP SERPL CALCULATED.3IONS-SCNC: 6 MMOL/L (ref 4–13)
AST SERPL W P-5'-P-CCNC: 19 U/L (ref 13–39)
BASOPHILS # BLD AUTO: 0.02 THOUSANDS/ÂΜL (ref 0–0.1)
BASOPHILS NFR BLD AUTO: 0 % (ref 0–1)
BILIRUB SERPL-MCNC: 0.55 MG/DL (ref 0.2–1)
BUN SERPL-MCNC: 9 MG/DL (ref 5–25)
CALCIUM SERPL-MCNC: 9.1 MG/DL (ref 8.4–10.2)
CHLORIDE SERPL-SCNC: 104 MMOL/L (ref 96–108)
CHOLEST SERPL-MCNC: 212 MG/DL
CO2 SERPL-SCNC: 28 MMOL/L (ref 21–32)
CREAT SERPL-MCNC: 0.54 MG/DL (ref 0.6–1.3)
EOSINOPHIL # BLD AUTO: 0.17 THOUSAND/ÂΜL (ref 0–0.61)
EOSINOPHIL NFR BLD AUTO: 3 % (ref 0–6)
ERYTHROCYTE [DISTWIDTH] IN BLOOD BY AUTOMATED COUNT: 13.8 % (ref 11.6–15.1)
EST. AVERAGE GLUCOSE BLD GHB EST-MCNC: 160 MG/DL
FERRITIN SERPL-MCNC: 35 NG/ML (ref 11–307)
GFR SERPL CREATININE-BSD FRML MDRD: 127 ML/MIN/1.73SQ M
GLUCOSE P FAST SERPL-MCNC: 135 MG/DL (ref 65–99)
HBA1C MFR BLD: 7.2 %
HCT VFR BLD AUTO: 41.6 % (ref 34.8–46.1)
HDLC SERPL-MCNC: 44 MG/DL
HGB BLD-MCNC: 12.7 G/DL (ref 11.5–15.4)
IMM GRANULOCYTES # BLD AUTO: 0.02 THOUSAND/UL (ref 0–0.2)
IMM GRANULOCYTES NFR BLD AUTO: 0 % (ref 0–2)
IRON SATN MFR SERPL: 13 % (ref 15–50)
IRON SERPL-MCNC: 41 UG/DL (ref 50–212)
LDLC SERPL CALC-MCNC: 153 MG/DL (ref 0–100)
LYMPHOCYTES # BLD AUTO: 2 THOUSANDS/ÂΜL (ref 0.6–4.47)
LYMPHOCYTES NFR BLD AUTO: 40 % (ref 14–44)
MCH RBC QN AUTO: 26.2 PG (ref 26.8–34.3)
MCHC RBC AUTO-ENTMCNC: 30.5 G/DL (ref 31.4–37.4)
MCV RBC AUTO: 86 FL (ref 82–98)
MONOCYTES # BLD AUTO: 0.41 THOUSAND/ÂΜL (ref 0.17–1.22)
MONOCYTES NFR BLD AUTO: 8 % (ref 4–12)
NEUTROPHILS # BLD AUTO: 2.37 THOUSANDS/ÂΜL (ref 1.85–7.62)
NEUTS SEG NFR BLD AUTO: 49 % (ref 43–75)
NRBC BLD AUTO-RTO: 0 /100 WBCS
PLATELET # BLD AUTO: 263 THOUSANDS/UL (ref 149–390)
PMV BLD AUTO: 10.3 FL (ref 8.9–12.7)
POTASSIUM SERPL-SCNC: 4.4 MMOL/L (ref 3.5–5.3)
PROT SERPL-MCNC: 7.5 G/DL (ref 6.4–8.4)
RBC # BLD AUTO: 4.85 MILLION/UL (ref 3.81–5.12)
SODIUM SERPL-SCNC: 138 MMOL/L (ref 135–147)
TIBC SERPL-MCNC: 311 UG/DL (ref 250–450)
TRIGL SERPL-MCNC: 76 MG/DL
TSH SERPL DL<=0.05 MIU/L-ACNC: 1.47 UIU/ML (ref 0.45–4.5)
UIBC SERPL-MCNC: 270 UG/DL (ref 155–355)
WBC # BLD AUTO: 4.99 THOUSAND/UL (ref 4.31–10.16)

## 2024-06-08 PROCEDURE — 36415 COLL VENOUS BLD VENIPUNCTURE: CPT

## 2024-06-08 PROCEDURE — 80061 LIPID PANEL: CPT

## 2024-06-08 PROCEDURE — 83540 ASSAY OF IRON: CPT

## 2024-06-08 PROCEDURE — 85025 COMPLETE CBC W/AUTO DIFF WBC: CPT

## 2024-06-08 PROCEDURE — 83550 IRON BINDING TEST: CPT

## 2024-06-08 PROCEDURE — 84443 ASSAY THYROID STIM HORMONE: CPT

## 2024-06-08 PROCEDURE — 83036 HEMOGLOBIN GLYCOSYLATED A1C: CPT

## 2024-06-08 PROCEDURE — 82728 ASSAY OF FERRITIN: CPT

## 2024-06-08 PROCEDURE — 80053 COMPREHEN METABOLIC PANEL: CPT

## 2024-08-15 ENCOUNTER — CONSULT (OUTPATIENT)
Dept: SURGERY | Facility: CLINIC | Age: 29
End: 2024-08-15
Payer: MEDICARE

## 2024-08-15 VITALS
OXYGEN SATURATION: 96 % | DIASTOLIC BLOOD PRESSURE: 88 MMHG | TEMPERATURE: 97.3 F | SYSTOLIC BLOOD PRESSURE: 140 MMHG | RESPIRATION RATE: 18 BRPM | HEART RATE: 86 BPM | BODY MASS INDEX: 54.5 KG/M2 | WEIGHT: 293 LBS

## 2024-08-15 DIAGNOSIS — L91.0 KELOID: ICD-10-CM

## 2024-08-15 DIAGNOSIS — L73.2 HIDRADENITIS SUPPURATIVA: Primary | ICD-10-CM

## 2024-08-15 DIAGNOSIS — Z87.898 HISTORY OF BREAST LUMP: ICD-10-CM

## 2024-08-15 PROCEDURE — 99203 OFFICE O/P NEW LOW 30 MIN: CPT | Performed by: SURGERY

## 2024-10-23 ENCOUNTER — APPOINTMENT (OUTPATIENT)
Dept: LAB | Facility: CLINIC | Age: 29
End: 2024-10-23
Payer: MEDICARE

## 2024-10-23 ENCOUNTER — OFFICE VISIT (OUTPATIENT)
Dept: FAMILY MEDICINE CLINIC | Facility: CLINIC | Age: 29
End: 2024-10-23
Payer: MEDICARE

## 2024-10-23 VITALS
WEIGHT: 293 LBS | HEART RATE: 60 BPM | HEIGHT: 62 IN | BODY MASS INDEX: 53.92 KG/M2 | RESPIRATION RATE: 18 BRPM | TEMPERATURE: 98.5 F | DIASTOLIC BLOOD PRESSURE: 82 MMHG | OXYGEN SATURATION: 98 % | SYSTOLIC BLOOD PRESSURE: 136 MMHG

## 2024-10-23 DIAGNOSIS — Z91.018 MULTIPLE FOOD ALLERGIES: ICD-10-CM

## 2024-10-23 DIAGNOSIS — E28.2 PCOS (POLYCYSTIC OVARIAN SYNDROME): ICD-10-CM

## 2024-10-23 DIAGNOSIS — E66.01 CLASS 3 SEVERE OBESITY DUE TO EXCESS CALORIES WITH SERIOUS COMORBIDITY AND BODY MASS INDEX (BMI) OF 50.0 TO 59.9 IN ADULT (HCC): ICD-10-CM

## 2024-10-23 DIAGNOSIS — E11.9 TYPE 2 DIABETES MELLITUS WITHOUT COMPLICATION, WITHOUT LONG-TERM CURRENT USE OF INSULIN (HCC): ICD-10-CM

## 2024-10-23 DIAGNOSIS — R06.02 SOB (SHORTNESS OF BREATH) ON EXERTION: ICD-10-CM

## 2024-10-23 DIAGNOSIS — Z00.00 ANNUAL PHYSICAL EXAM: Primary | ICD-10-CM

## 2024-10-23 DIAGNOSIS — E66.813 CLASS 3 SEVERE OBESITY DUE TO EXCESS CALORIES WITH SERIOUS COMORBIDITY AND BODY MASS INDEX (BMI) OF 50.0 TO 59.9 IN ADULT (HCC): ICD-10-CM

## 2024-10-23 DIAGNOSIS — E78.2 MIXED HYPERLIPIDEMIA: ICD-10-CM

## 2024-10-23 PROCEDURE — 82785 ASSAY OF IGE: CPT

## 2024-10-23 PROCEDURE — 99395 PREV VISIT EST AGE 18-39: CPT | Performed by: NURSE PRACTITIONER

## 2024-10-23 PROCEDURE — 86003 ALLG SPEC IGE CRUDE XTRC EA: CPT

## 2024-10-23 PROCEDURE — 99214 OFFICE O/P EST MOD 30 MIN: CPT | Performed by: NURSE PRACTITIONER

## 2024-10-23 RX ORDER — ALBUTEROL SULFATE 90 UG/1
2 INHALANT RESPIRATORY (INHALATION) EVERY 4 HOURS PRN
Status: DISCONTINUED | OUTPATIENT
Start: 2024-10-23 | End: 2024-10-23

## 2024-10-23 RX ORDER — ATORVASTATIN CALCIUM 20 MG/1
20 TABLET, FILM COATED ORAL DAILY
Qty: 30 TABLET | Refills: 3 | Status: SHIPPED | OUTPATIENT
Start: 2024-10-23

## 2024-10-23 RX ORDER — ALBUTEROL SULFATE 90 UG/1
2 INHALANT RESPIRATORY (INHALATION) EVERY 6 HOURS PRN
Qty: 1 G | Refills: 3 | Status: SHIPPED | OUTPATIENT
Start: 2024-10-23

## 2024-10-23 RX ORDER — METFORMIN HYDROCHLORIDE 500 MG/1
1000 TABLET, EXTENDED RELEASE ORAL
Qty: 180 TABLET | Refills: 3 | Status: SHIPPED | OUTPATIENT
Start: 2024-10-23 | End: 2025-10-23

## 2024-10-23 NOTE — PROGRESS NOTES
Adult Annual Physical  Name: Reyna Orosco      : 1995      MRN: [id number]  Encounter Provider: SIMON Gates  Encounter Date: 10/23/2024   Encounter department: [facility]    Assessment & Plan  Type 2 diabetes mellitus without complication, without long-term current use of insulin ([facility])    Lab Results   Component Value Date    HGBA1C 7.2 (H) 2024       Orders:    semaglutide, 0.25 or 0.5 mg/dose, (Ozempic, 0.25 or 0.5 MG/DOSE,) 2 mg/3 mL injection pen; 0.25 mg under the skin every 7 days for 4 doses (28 days), THEN 0.5 mg under the skin every 7 days    Mixed hyperlipidemia    Orders:    atorvastatin (LIPITOR) 20 mg tablet; Take 1 tablet (20 mg total) by mouth daily    PCOS (polycystic ovarian syndrome)    Orders:    metFORMIN (GLUCOPHAGE-XR) 500 mg 24 hr tablet; Take 2 tablets (1,000 mg total) by mouth daily with dinner    Annual physical exam         Multiple food allergies    Orders:    Allergen Profile, Food-Basic; Future    SOB (shortness of breath) on exertion    Orders:    albuterol (ProAir HFA) 90 mcg/act inhaler; Inhale 2 puffs every 6 (six) hours as needed for wheezing    Class 3 severe obesity due to excess calories with serious comorbidity and body mass index (BMI) of 50.0 to 59.9 in adult ([facility])  Will attempt ozempic as an add on therapy to the metformin          Immunizations and preventive care screenings were discussed with patient today. Appropriate education was printed on patient's after visit summary.    Counseling:  Exercise: the importance of regular exercise/physical activity was discussed. Recommend exercise 3-5 times per week for at least 30 minutes.          History of Present Illness     Adult Annual Physical:  Patient presents for annual physical. Patient presents for establish care with new provider in office. Most recent labs reviewed. Patient is open to trying medication for her weight loss and diabetes as her numbers are increasing.  "She is concerned about food allergy but cannot determine what might be causing this. Lab for food allergy ordered. .     Diet and Physical Activity:  - Diet/Nutrition: well balanced diet.  - Exercise: 30-60 minutes on average and moderate cardiovascular exercise.    Depression Screening:  - PHQ-2 Score: 0    General Health:  - Sleep: sleeps well and 7-8 hours of sleep on average.  - Hearing: normal hearing bilateral ears.  - Vision: vision problems.  - Dental: brushes teeth twice daily.    /GYN Health:  - Follows with GYN: yes.     Review of Systems   Constitutional:  Negative for chills and fever.   HENT:  Negative for ear pain and sore throat.    Eyes:  Negative for pain and visual disturbance.   Respiratory:  Negative for cough and shortness of breath.    Cardiovascular:  Negative for chest pain and palpitations.   Gastrointestinal:  Negative for abdominal pain and vomiting.   Genitourinary:  Negative for dysuria and hematuria.   Musculoskeletal:  Negative for arthralgias and back pain.   Skin:  Negative for color change and rash.   Allergic/Immunologic: Positive for food allergies.   Neurological:  Negative for seizures and syncope.   All other systems reviewed and are negative.        Objective     /82   Pulse 60   Temp 98.5 °F (36.9 °C) (Temporal)   Resp 18   Ht 5' 2\" (1.575 m)   Wt 135 kg (298 lb)   SpO2 98%   BMI 54.50 kg/m²     Physical Exam  Vitals and nursing note reviewed.   Constitutional:       General: She is not in acute distress.     Appearance: She is well-developed. She is obese.   HENT:      Head: Normocephalic and atraumatic.      Right Ear: Tympanic membrane, ear canal and external ear normal.      Left Ear: Tympanic membrane, ear canal and external ear normal.      Ears:      Comments: Left ear with debris in the canal no impaction noted      Mouth/Throat:      Mouth: Mucous membranes are moist.      Pharynx: Oropharynx is clear. No oropharyngeal exudate or posterior " oropharyngeal erythema.   Eyes:      Conjunctiva/sclera: Conjunctivae normal.   Cardiovascular:      Rate and Rhythm: Normal rate and regular rhythm.      Heart sounds: No murmur heard.  Pulmonary:      Effort: Pulmonary effort is normal. No respiratory distress.      Breath sounds: Normal breath sounds.   Abdominal:      Palpations: Abdomen is soft.      Tenderness: There is no abdominal tenderness.   Musculoskeletal:         General: No swelling.      Cervical back: Neck supple.   Skin:     General: Skin is warm and dry.      Capillary Refill: Capillary refill takes less than 2 seconds.   Neurological:      Mental Status: She is alert and oriented to person, place, and time.   Psychiatric:         Mood and Affect: Mood normal.         Behavior: Behavior normal.         Thought Content: Thought content normal.         Judgment: Judgment normal.

## 2024-10-23 NOTE — ASSESSMENT & PLAN NOTE
Orders:    metFORMIN (GLUCOPHAGE-XR) 500 mg 24 hr tablet; Take 2 tablets (1,000 mg total) by mouth daily with dinner

## 2024-10-23 NOTE — PROGRESS NOTES
"Ambulatory Visit  Name: Reyna Orosco      : 1995      MRN: [id number]  Encounter Provider: SIMON Gates  Encounter Date: 10/23/2024   Encounter department: [facility]    Assessment & Plan       History of Present Illness     HPI      Review of Systems        Objective     /82   Pulse 60   Temp 98.5 °F (36.9 °C) (Temporal)   Resp 18   Ht 5' 2\" (1.575 m)   Wt 135 kg (298 lb)   SpO2 98%   BMI 54.50 kg/m²     Physical Exam    "

## 2024-10-23 NOTE — PATIENT INSTRUCTIONS
"Patient Education     Routine physical for adults   The Basics   Written by the doctors and editors at [facility]   What is a physical? -- A physical is a routine visit, or \"check-up,\" with your doctor. You might also hear it called a \"wellness visit\" or \"preventive visit.\"  During each visit, the doctor will:   Ask about your physical and mental health   Ask about your habits, behaviors, and lifestyle   Do an exam   Give you vaccines if needed   Talk to you about any medicines you take   Give advice about your health   Answer your questions  Getting regular check-ups is an important part of taking care of your health. It can help your doctor find and treat any problems you have. But it's also important for preventing health problems.  A routine physical is different from a \"sick visit.\" A sick visit is when you see a doctor because of a health concern or problem. Since physicals are scheduled ahead of time, you can think about what you want to ask the doctor.  How often should I get a physical? -- It depends on your age and health. In general, for people age 21 years and older:   If you are younger than 50 years, you might be able to get a physical every 3 years.   If you are 50 years or older, your doctor might recommend a physical every year.  If you have an ongoing health condition, like diabetes or high blood pressure, your doctor will probably want to see you more often.  What happens during a physical? -- In general, each visit will include:   Physical exam - The doctor or nurse will check your height, weight, heart rate, and blood pressure. They will also look at your eyes and ears. They will ask about how you are feeling and whether you have any symptoms that bother you.   Medicines - It's a good idea to bring a list of all the medicines you take to each doctor visit. Your doctor will talk to you about your medicines and answer any questions. Tell them if you are having any side effects that bother you. You " "should also tell them if you are having trouble paying for any of your medicines.   Habits and behaviors - This includes:   Your diet   Your exercise habits   Whether you smoke, drink alcohol, or use drugs   Whether you are sexually active   Whether you feel safe at home  Your doctor will talk to you about things you can do to improve your health and lower your risk of health problems. They will also offer help and support. For example, if you want to quit smoking, they can give you advice and might prescribe medicines. If you want to improve your diet or get more physical activity, they can help you with this, too.   Lab tests, if needed - The tests you get will depend on your age and situation. For example, your doctor might want to check your:   Cholesterol   Blood sugar   Iron level   Vaccines - The recommended vaccines will depend on your age, health, and what vaccines you already had. Vaccines are very important because they can prevent certain serious or deadly infections.   Discussion of screening - \"Screening\" means checking for diseases or other health problems before they cause symptoms. Your doctor can recommend screening based on your age, risk, and preferences. This might include tests to check for:   Cancer, such as breast, prostate, cervical, ovarian, colorectal, prostate, lung, or skin cancer   Sexually transmitted infections, such as chlamydia and gonorrhea   Mental health conditions like depression and anxiety  Your doctor will talk to you about the different types of screening tests. They can help you decide which screenings to have. They can also explain what the results might mean.   Answering questions - The physical is a good time to ask the doctor or nurse questions about your health. If needed, they can refer you to other doctors or specialists, too.  Adults older than 65 years often need other care, too. As you get older, your doctor will talk to you about:   How to prevent falling at " home   Hearing or vision tests   Memory testing   How to take your medicines safely   Making sure that you have the help and support you need at home  All topics are updated as new evidence becomes available and our peer review process is complete.  This topic retrieved from [employer] on: May 02, 2024.  Topic 093316 Version 1.0  Release: 32.4.3 - C32.122  © 2024 UpToDate, Inc. and/or its affiliates. All rights reserved.  Consumer Information Use and Disclaimer   Disclaimer: This generalized information is a limited summary of diagnosis, treatment, and/or medication information. It is not meant to be comprehensive and should be used as a tool to help the user understand and/or assess potential diagnostic and treatment options. It does NOT include all information about conditions, treatments, medications, side effects, or risks that may apply to a specific patient. It is not intended to be medical advice or a substitute for the medical advice, diagnosis, or treatment of a health care provider based on the health care provider's examination and assessment of a patient's specific and unique circumstances. Patients must speak with a health care provider for complete information about their health, medical questions, and treatment options, including any risks or benefits regarding use of medications. This information does not endorse any treatments or medications as safe, effective, or approved for treating a specific patient. UpToDate, Inc. and its affiliates disclaim any warranty or liability relating to this information or the use thereof.The use of this information is governed by the Terms of Use, available at https://www.woltersConnectSoftuwer.com/en/know/clinical-effectiveness-terms. 2024© UpToDate, Inc. and its affiliates and/or licensors. All rights reserved.  Copyright   © 2024 UpToDate, Inc. and/or its affiliates. All rights reserved.

## 2024-10-24 LAB
ALMOND IGE QN: <0.1 KUA/I
CASHEW NUT IGE QN: <0.1 KUA/I
CODFISH IGE QN: <0.1 KUA/I
EGG WHITE IGE QN: <0.1 KUA/I
GLUTEN IGE QN: <0.1 KUA/I
HAZELNUT IGE QN: <0.1 KUA/L
MILK IGE QN: <0.1 KUA/I
PEANUT IGE QN: <0.1 KUA/I
SALMON IGE QN: <0.1 KUA/I
SCALLOP IGE QN: <0.1 KUA/L
SESAME SEED IGE QN: <0.1 KUA/I
SHRIMP IGE QN: <0.1 KUA/L
SOYBEAN IGE QN: <0.1 KUA/I
TOTAL IGE SMQN RAST: 9.84 KU/L (ref 0–113)
TUNA IGE QN: <0.1 KUA/I
WALNUT IGE QN: <0.1 KUA/I
WHEAT IGE QN: <0.1 KUA/I

## 2025-01-11 DIAGNOSIS — E11.9 TYPE 2 DIABETES MELLITUS WITHOUT COMPLICATION, WITHOUT LONG-TERM CURRENT USE OF INSULIN (HCC): ICD-10-CM

## 2025-01-23 ENCOUNTER — APPOINTMENT (OUTPATIENT)
Dept: LAB | Facility: CLINIC | Age: 30
End: 2025-01-23
Payer: MEDICARE

## 2025-01-23 ENCOUNTER — OFFICE VISIT (OUTPATIENT)
Dept: FAMILY MEDICINE CLINIC | Facility: CLINIC | Age: 30
End: 2025-01-23
Payer: MEDICARE

## 2025-01-23 VITALS
WEIGHT: 284 LBS | DIASTOLIC BLOOD PRESSURE: 84 MMHG | HEART RATE: 81 BPM | HEIGHT: 62 IN | SYSTOLIC BLOOD PRESSURE: 132 MMHG | BODY MASS INDEX: 52.26 KG/M2 | TEMPERATURE: 98.2 F | OXYGEN SATURATION: 99 %

## 2025-01-23 DIAGNOSIS — E28.2 PCOS (POLYCYSTIC OVARIAN SYNDROME): ICD-10-CM

## 2025-01-23 DIAGNOSIS — E11.9 TYPE 2 DIABETES MELLITUS WITHOUT COMPLICATION, WITHOUT LONG-TERM CURRENT USE OF INSULIN (HCC): ICD-10-CM

## 2025-01-23 DIAGNOSIS — E11.9 TYPE 2 DIABETES MELLITUS WITHOUT COMPLICATION, WITHOUT LONG-TERM CURRENT USE OF INSULIN (HCC): Primary | ICD-10-CM

## 2025-01-23 DIAGNOSIS — D50.9 MICROCYTIC ANEMIA: ICD-10-CM

## 2025-01-23 DIAGNOSIS — E78.2 MIXED HYPERLIPIDEMIA: ICD-10-CM

## 2025-01-23 LAB
25(OH)D3 SERPL-MCNC: 14.5 NG/ML (ref 30–100)
ALBUMIN SERPL BCG-MCNC: 4 G/DL (ref 3.5–5)
ALP SERPL-CCNC: 70 U/L (ref 34–104)
ALT SERPL W P-5'-P-CCNC: 20 U/L (ref 7–52)
ANION GAP SERPL CALCULATED.3IONS-SCNC: 10 MMOL/L (ref 4–13)
AST SERPL W P-5'-P-CCNC: 14 U/L (ref 13–39)
BASOPHILS # BLD AUTO: 0.03 THOUSANDS/ΜL (ref 0–0.1)
BASOPHILS NFR BLD AUTO: 1 % (ref 0–1)
BILIRUB SERPL-MCNC: 0.58 MG/DL (ref 0.2–1)
BUN SERPL-MCNC: 9 MG/DL (ref 5–25)
CALCIUM SERPL-MCNC: 9.5 MG/DL (ref 8.4–10.2)
CHLORIDE SERPL-SCNC: 101 MMOL/L (ref 96–108)
CHOLEST SERPL-MCNC: 220 MG/DL (ref ?–200)
CO2 SERPL-SCNC: 27 MMOL/L (ref 21–32)
CREAT SERPL-MCNC: 0.62 MG/DL (ref 0.6–1.3)
EOSINOPHIL # BLD AUTO: 0.14 THOUSAND/ΜL (ref 0–0.61)
EOSINOPHIL NFR BLD AUTO: 2 % (ref 0–6)
ERYTHROCYTE [DISTWIDTH] IN BLOOD BY AUTOMATED COUNT: 13.9 % (ref 11.6–15.1)
EST. AVERAGE GLUCOSE BLD GHB EST-MCNC: 134 MG/DL
FERRITIN SERPL-MCNC: 42 NG/ML (ref 11–307)
GFR SERPL CREATININE-BSD FRML MDRD: 121 ML/MIN/1.73SQ M
GLUCOSE P FAST SERPL-MCNC: 100 MG/DL (ref 65–99)
HBA1C MFR BLD: 6.3 %
HCT VFR BLD AUTO: 43.8 % (ref 34.8–46.1)
HDLC SERPL-MCNC: 47 MG/DL
HGB BLD-MCNC: 13.6 G/DL (ref 11.5–15.4)
IMM GRANULOCYTES # BLD AUTO: 0.03 THOUSAND/UL (ref 0–0.2)
IMM GRANULOCYTES NFR BLD AUTO: 1 % (ref 0–2)
IRON SATN MFR SERPL: 11 % (ref 15–50)
IRON SERPL-MCNC: 37 UG/DL (ref 50–212)
LDLC SERPL CALC-MCNC: 157 MG/DL (ref 0–100)
LYMPHOCYTES # BLD AUTO: 1.62 THOUSANDS/ΜL (ref 0.6–4.47)
LYMPHOCYTES NFR BLD AUTO: 27 % (ref 14–44)
MCH RBC QN AUTO: 26.4 PG (ref 26.8–34.3)
MCHC RBC AUTO-ENTMCNC: 31.1 G/DL (ref 31.4–37.4)
MCV RBC AUTO: 85 FL (ref 82–98)
MONOCYTES # BLD AUTO: 0.49 THOUSAND/ΜL (ref 0.17–1.22)
MONOCYTES NFR BLD AUTO: 8 % (ref 4–12)
NEUTROPHILS # BLD AUTO: 3.73 THOUSANDS/ΜL (ref 1.85–7.62)
NEUTS SEG NFR BLD AUTO: 61 % (ref 43–75)
NONHDLC SERPL-MCNC: 173 MG/DL
NRBC BLD AUTO-RTO: 0 /100 WBCS
PLATELET # BLD AUTO: 280 THOUSANDS/UL (ref 149–390)
PMV BLD AUTO: 10 FL (ref 8.9–12.7)
POTASSIUM SERPL-SCNC: 4.4 MMOL/L (ref 3.5–5.3)
PROT SERPL-MCNC: 7.5 G/DL (ref 6.4–8.4)
RBC # BLD AUTO: 5.15 MILLION/UL (ref 3.81–5.12)
SODIUM SERPL-SCNC: 138 MMOL/L (ref 135–147)
TIBC SERPL-MCNC: 329 UG/DL (ref 250–450)
TRANSFERRIN SERPL-MCNC: 235 MG/DL (ref 203–362)
TRIGL SERPL-MCNC: 82 MG/DL (ref ?–150)
TSH SERPL DL<=0.05 MIU/L-ACNC: 2.5 UIU/ML (ref 0.45–4.5)
UIBC SERPL-MCNC: 292 UG/DL (ref 155–355)
VIT B12 SERPL-MCNC: 229 PG/ML (ref 180–914)
WBC # BLD AUTO: 6.04 THOUSAND/UL (ref 4.31–10.16)

## 2025-01-23 PROCEDURE — 82607 VITAMIN B-12: CPT

## 2025-01-23 PROCEDURE — 82728 ASSAY OF FERRITIN: CPT

## 2025-01-23 PROCEDURE — 83550 IRON BINDING TEST: CPT

## 2025-01-23 PROCEDURE — 80053 COMPREHEN METABOLIC PANEL: CPT

## 2025-01-23 PROCEDURE — 85025 COMPLETE CBC W/AUTO DIFF WBC: CPT

## 2025-01-23 PROCEDURE — 83036 HEMOGLOBIN GLYCOSYLATED A1C: CPT

## 2025-01-23 PROCEDURE — 99214 OFFICE O/P EST MOD 30 MIN: CPT | Performed by: NURSE PRACTITIONER

## 2025-01-23 PROCEDURE — 84443 ASSAY THYROID STIM HORMONE: CPT

## 2025-01-23 PROCEDURE — 82306 VITAMIN D 25 HYDROXY: CPT

## 2025-01-23 PROCEDURE — 80061 LIPID PANEL: CPT

## 2025-01-23 PROCEDURE — 36415 COLL VENOUS BLD VENIPUNCTURE: CPT

## 2025-01-23 PROCEDURE — 83540 ASSAY OF IRON: CPT

## 2025-01-23 NOTE — PROGRESS NOTES
Name: Reyna Orosco      : 1995      MRN: [id number]  Encounter Provider: SIMON Gates  Encounter Date: 2025   Encounter department: [facility]  :  Assessment & Plan  Type 2 diabetes mellitus without complication, without long-term current use of insulin (HCC)    Lab Results   Component Value Date    HGBA1C 7.2 (H) 2024       Orders:    Comprehensive metabolic panel; Future    Hemoglobin A1C; Future    Lipid panel; Future    TSH, 3rd generation with Free T4 reflex; Future    Mixed hyperlipidemia  Could not tolerate the lipitor patient reports a rash  Will have labs today and may use zetia or a low dose crestor        PCOS (polycystic ovarian syndrome)    Orders:    Comprehensive metabolic panel; Future    Microcytic anemia    Orders:    Iron Panel (Includes Ferritin, Iron Sat%, Iron, and TIBC); Future    CBC and differential; Future    Vitamin B12; Future    Vitamin D 25 hydroxy; Future          Depression Screening and Follow-up Plan: Patient was screened for depression during today's encounter. They screened negative with a PHQ-2 score of 0.      History of Present Illness   Patient presents for a follow up visit, she does not need to utilize an  today. She is feeling well and offers no complaints   Medications reviewed and patient reports that she got a rash from the atorvastatin.   Labs will be done today       Review of Systems   Constitutional:  Negative for chills and fever.   HENT:  Negative for ear pain and sore throat.    Eyes:  Negative for pain and visual disturbance.   Respiratory:  Negative for cough and shortness of breath.    Cardiovascular:  Negative for chest pain and palpitations.   Gastrointestinal:  Negative for abdominal pain and vomiting.   Genitourinary:  Negative for dysuria and hematuria.   Musculoskeletal:  Negative for arthralgias and back pain.   Skin:  Negative for color change and rash.   Neurological:  Negative for  "seizures and syncope.   All other systems reviewed and are negative.      Objective   /84   Pulse 81   Temp 98.2 °F (36.8 °C)   Ht 5' 2\" (1.575 m)   Wt 129 kg (284 lb)   SpO2 99%   BMI 51.94 kg/m²      Physical Exam  Vitals and nursing note reviewed.   Constitutional:       General: She is not in acute distress.     Appearance: She is well-developed. She is obese.   HENT:      Head: Normocephalic and atraumatic.      Nose: Nose normal.      Mouth/Throat:      Mouth: Mucous membranes are moist.   Eyes:      Conjunctiva/sclera: Conjunctivae normal.   Cardiovascular:      Rate and Rhythm: Normal rate and regular rhythm.      Heart sounds: No murmur heard.  Pulmonary:      Effort: Pulmonary effort is normal. No respiratory distress.      Breath sounds: Normal breath sounds.   Abdominal:      Palpations: Abdomen is soft.      Tenderness: There is no abdominal tenderness.   Musculoskeletal:         General: No swelling.      Cervical back: Neck supple.   Skin:     General: Skin is warm and dry.      Capillary Refill: Capillary refill takes less than 2 seconds.   Neurological:      Mental Status: She is alert and oriented to person, place, and time.   Psychiatric:         Mood and Affect: Mood normal.         Behavior: Behavior normal.         Thought Content: Thought content normal.         Judgment: Judgment normal.         "

## 2025-01-23 NOTE — ASSESSMENT & PLAN NOTE
Orders:    Iron Panel (Includes Ferritin, Iron Sat%, Iron, and TIBC); Future    CBC and differential; Future    Vitamin B12; Future    Vitamin D 25 hydroxy; Future

## 2025-02-02 PROBLEM — L91.0 KELOID: Status: ACTIVE | Noted: 2025-02-02

## 2025-02-02 NOTE — PROGRESS NOTES
Consult - General Surgery   Buford MAT Orosco 29 y.o. female MRN: [id number]  Encounter: [id number]    Assessment & Plan     29F with morbid obesity BMI 51 status post left breast surgical excisional debridement of infected hidradenitis with Dr. Bee in 2021. Areas of symptomatic disease include left axilla previously and now bilateral breasts, some scattered disease on her back. She is on and off antibiotics but has not been following closely with dermatology for medical management. Has never met with plastic surgery to discuss further wide excision. She also has keloidal scarring on her breast from her prior surgery.    There is no site warranting incision and drainage or antibiotic therapy today. I strongly recommend establishing care with dermatology for disease control and further management. We discussed baseline skin care and medical management that can often reduce flares and quiet the disease down. Referral written, contact information given for dermatology.      History of Present Illness   Chief Complaint   Patient presents with    Consult     Hidradenitis suppurativa,History of breast lump(last US of breast 3/25/21)     Bilateral breast hidradenitis, prior excision L breast with Dr. Bee, area has healed with a keloid. Prior L axillary disease now quiet, some scattered disease on her back. Has tried doxycycline on and off. Has not been following with derm.    Menarche - 15  Pregnancies - 0  OCP - no  Menopause - N/A  HRT - no  Personal - surgical excision with Dr. Bee, left breast HS  Family - no cancer        Review of Systems   Constitutional: Negative.    Skin:  Positive for color change and wound.   Hematological: Negative.    All other systems reviewed and are negative.      Historical Information   Past Medical History:   Diagnosis Date    Asthma     Diabetes mellitus (HCC)     Polycystic ovarian disease      Past Surgical History:   Procedure Laterality Date    NO PAST SURGERIES       AK EXC B9 LESION MRGN XCP SK TG T/A/L 0.5 CM/< Left 9/30/2021    Procedure: EXCISION WIDE LESION BREAST;  Surgeon: Shannon Bee MD;  Location: [facility];  Service: General     Social History   Social History     Substance and Sexual Activity   Alcohol Use Not Currently    Alcohol/week: 6.0 standard drinks of alcohol    Types: 6 Cans of beer per week    Comment: social     Social History     Substance and Sexual Activity   Drug Use Never     Social History     Tobacco Use   Smoking Status Never   Smokeless Tobacco Never     Family History   Problem Relation Age of Onset    Hypertension Mother     Diabetes Mother     Asthma Mother     Arthritis Mother     Alcohol abuse Father     No Known Problems Sister     No Known Problems Brother     No Known Problems Brother     No Known Problems Brother     No Known Problems Brother     No Known Problems Brother     No Known Problems Brother        Meds/Allergies     Current Outpatient Medications:     albuterol (ProAir HFA) 90 mcg/act inhaler, Inhale 2 puffs every 6 (six) hours as needed for wheezing, Disp: 1 g, Rfl: 3    metFORMIN (GLUCOPHAGE-XR) 500 mg 24 hr tablet, Take 2 tablets (1,000 mg total) by mouth daily with dinner, Disp: 180 tablet, Rfl: 3    semaglutide, 0.25 or 0.5 mg/dose, (Ozempic, 0.25 or 0.5 MG/DOSE,) 2 mg/3 mL injection pen, 0.25 mg under the skin every 7 days for 4 doses (28 days), THEN 0.5 mg under the skin every 7 days, Disp: 9 mL, Rfl: 0  No Known Allergies    The following portions of the patient's history were reviewed and updated as appropriate: allergies, current medications, past family history, past medical history, past social history, past surgical history, and problem list.    Objective   Current Vitals:   Blood pressure 140/88, pulse 86, temperature (!) 97.3 °F (36.3 °C), temperature source Temporal, resp. rate 18, weight 135 kg (298 lb), SpO2 96%.    Physical Exam  Vitals reviewed.   Constitutional:       General: She is not in acute  distress.     Appearance: She is not toxic-appearing.   HENT:      Head: Normocephalic.      Nose: No congestion.      Mouth/Throat:      Mouth: Mucous membranes are moist.   Eyes:      Pupils: Pupils are equal, round, and reactive to light.   Cardiovascular:      Rate and Rhythm: Normal rate.   Pulmonary:      Effort: Pulmonary effort is normal. No respiratory distress.   Abdominal:      Palpations: Abdomen is soft.   Musculoskeletal:         General: Normal range of motion.      Cervical back: Normal range of motion and neck supple.   Lymphadenopathy:      Cervical: No cervical adenopathy.   Skin:     General: Skin is warm and dry.      Capillary Refill: Capillary refill takes less than 2 seconds.      Findings: Lesion present.   Neurological:      General: No focal deficit present.      Mental Status: She is alert.   Psychiatric:         Mood and Affect: Mood normal.       The patient has no palpable cervical, supraclavicular, or axillary lymphadenopathy bilaterally.  The breasts are symmetric.  There is a keloidal scar along the left medial scar, there are multiple areas of hidradenitis throughout the bilateral breasts, no cellulitis, no fluctuance.There are otherwise no dominant lumps, masses, skin changes or nipple retraction bilaterally.     Signature:  Rachelle Evangelista MD  Date: 2/2/2025 Time: 10:17 AM

## 2025-02-05 DIAGNOSIS — E11.9 TYPE 2 DIABETES MELLITUS WITHOUT COMPLICATION, WITHOUT LONG-TERM CURRENT USE OF INSULIN (HCC): ICD-10-CM

## 2025-02-21 ENCOUNTER — TELEPHONE (OUTPATIENT)
Dept: SURGERY | Facility: CLINIC | Age: 30
End: 2025-02-21

## 2025-02-21 NOTE — TELEPHONE ENCOUNTER
[facility] doesn't accept her insurance, Faxed patients referral and info to [facility] [phone]

## 2025-03-24 DIAGNOSIS — E11.9 TYPE 2 DIABETES MELLITUS WITHOUT COMPLICATION, WITHOUT LONG-TERM CURRENT USE OF INSULIN (HCC): ICD-10-CM

## 2025-03-28 ENCOUNTER — TELEPHONE (OUTPATIENT)
Dept: DERMATOLOGY | Facility: CLINIC | Age: 30
End: 2025-03-28

## 2025-03-28 NOTE — TELEPHONE ENCOUNTER
TOMASAM (through ) for Pt advising we would like to schedule an appt for her based on her referral (if still needed), advised Pt to callback, Letter sent

## 2025-04-20 DIAGNOSIS — E28.2 PCOS (POLYCYSTIC OVARIAN SYNDROME): ICD-10-CM

## 2025-04-20 DIAGNOSIS — E11.9 TYPE 2 DIABETES MELLITUS WITHOUT COMPLICATION, WITHOUT LONG-TERM CURRENT USE OF INSULIN (HCC): ICD-10-CM

## 2025-04-20 RX ORDER — METFORMIN HYDROCHLORIDE 500 MG/1
1000 TABLET, EXTENDED RELEASE ORAL
Qty: 180 TABLET | Refills: 1 | Status: SHIPPED | OUTPATIENT
Start: 2025-04-20 | End: 2026-04-20

## 2025-08-17 ENCOUNTER — HOSPITAL ENCOUNTER (EMERGENCY)
Facility: HOSPITAL | Age: 30
Discharge: HOME | End: 2025-08-17
Attending: EMERGENCY MEDICINE | Admitting: EMERGENCY MEDICINE
Payer: MEDICARE

## 2025-08-17 VITALS
BODY MASS INDEX: 53.92 KG/M2 | HEART RATE: 78 BPM | TEMPERATURE: 98.3 F | RESPIRATION RATE: 17 BRPM | HEIGHT: 62 IN | DIASTOLIC BLOOD PRESSURE: 79 MMHG | SYSTOLIC BLOOD PRESSURE: 138 MMHG | OXYGEN SATURATION: 98 % | WEIGHT: 293 LBS

## 2025-08-17 DIAGNOSIS — J06.9 UPPER RESPIRATORY INFECTION: Primary | ICD-10-CM

## 2025-08-17 LAB
FLUAV AG UPPER RESP QL IA.RAPID: NEGATIVE
FLUBV AG UPPER RESP QL IA.RAPID: NEGATIVE
S PYO DNA THROAT QL NAA+PROBE: NOT DETECTED
SARS-COV+SARS-COV-2 AG RESP QL IA.RAPID: NEGATIVE

## 2025-08-17 PROCEDURE — 99284 EMERGENCY DEPT VISIT MOD MDM: CPT | Performed by: EMERGENCY MEDICINE

## 2025-08-17 PROCEDURE — 87804 INFLUENZA ASSAY W/OPTIC: CPT | Performed by: EMERGENCY MEDICINE

## 2025-08-17 PROCEDURE — 87651 STREP A DNA AMP PROBE: CPT | Performed by: EMERGENCY MEDICINE

## 2025-08-17 PROCEDURE — 96372 THER/PROPH/DIAG INJ SC/IM: CPT

## 2025-08-17 PROCEDURE — 87811 SARS-COV-2 COVID19 W/OPTIC: CPT | Performed by: EMERGENCY MEDICINE

## 2025-08-17 PROCEDURE — 99283 EMERGENCY DEPT VISIT LOW MDM: CPT

## 2025-08-17 RX ORDER — KETOROLAC TROMETHAMINE 30 MG/ML
30 INJECTION, SOLUTION INTRAMUSCULAR; INTRAVENOUS ONCE
Status: COMPLETED | OUTPATIENT
Start: 2025-08-17 | End: 2025-08-17

## 2025-08-17 RX ADMIN — KETOROLAC TROMETHAMINE 30 MG: 30 INJECTION, SOLUTION INTRAMUSCULAR at 01:05

## (undated) DEVICE — 10FR FRAZIER SUCTION HANDLE: Brand: CARDINAL HEALTH

## (undated) DEVICE — SUT MONOCRYL 4-0 PS-2 27 IN Y426H

## (undated) DEVICE — GLOVE INDICATOR PI UNDERGLOVE SZ 6.5 BLUE

## (undated) DEVICE — DRAPE EQUIPMENT RF WAND

## (undated) DEVICE — 2000CC GUARDIAN II: Brand: GUARDIAN

## (undated) DEVICE — ELECTRODE NEEDLE MOD E-Z CLEAN 2.75IN 7CM -0013M

## (undated) DEVICE — SUT VICRYL 3-0 SH 27 IN J416H

## (undated) DEVICE — PLUMEPEN PRO 10FT

## (undated) DEVICE — SCD SEQUENTIAL COMPRESSION COMFORT SLEEVE MEDIUM KNEE LENGTH: Brand: KENDALL SCD

## (undated) DEVICE — GLOVE SRG BIOGEL 7

## (undated) DEVICE — SUT ETHILON 2-0 FS 18 IN 664H

## (undated) DEVICE — NEEDLE HYPO 22G X 1-1/2 IN

## (undated) DEVICE — INTENDED FOR TISSUE SEPARATION, AND OTHER PROCEDURES THAT REQUIRE A SHARP SURGICAL BLADE TO PUNCTURE OR CUT.: Brand: BARD-PARKER SAFETY BLADES SIZE 15, STERILE

## (undated) DEVICE — BRA SURGICAL SZ XLGE (39-42)

## (undated) DEVICE — GAUZE SPONGES,8 PLY: Brand: CURITY

## (undated) DEVICE — BETHLEHEM UNIVERSAL MINOR GEN: Brand: CARDINAL HEALTH

## (undated) DEVICE — 2963 MEDIPORE SOFT CLOTH TAPE 3 IN X 10 YD 12 RLS/CS: Brand: 3M™ MEDIPORE™

## (undated) DEVICE — GLOVE SRG BIOGEL 6.5

## (undated) DEVICE — GAUZE SPONGES,16 PLY: Brand: CURITY

## (undated) DEVICE — CHLORAPREP HI-LITE 26ML ORANGE